# Patient Record
Sex: FEMALE | Race: WHITE | NOT HISPANIC OR LATINO | Employment: FULL TIME | URBAN - METROPOLITAN AREA
[De-identification: names, ages, dates, MRNs, and addresses within clinical notes are randomized per-mention and may not be internally consistent; named-entity substitution may affect disease eponyms.]

---

## 2017-01-04 ENCOUNTER — TELEPHONE (OUTPATIENT)
Dept: FAMILY MEDICINE | Facility: CLINIC | Age: 52
End: 2017-01-04

## 2017-01-04 DIAGNOSIS — D25.9 UTERINE LEIOMYOMA, UNSPECIFIED LOCATION: ICD-10-CM

## 2017-01-04 DIAGNOSIS — N83.201 CYST OF RIGHT OVARY: Primary | ICD-10-CM

## 2017-01-04 NOTE — TELEPHONE ENCOUNTER
[facility] called, Radiologist is working on the final results.   [facility] to call triage back.

## 2017-01-04 NOTE — TELEPHONE ENCOUNTER
Blake at [facility] was called, she will fax preliminary results and will check and call back re final results.   Fax received with preliminary results, will pend message for final results.

## 2017-01-04 NOTE — TELEPHONE ENCOUNTER
Reason for Call:  Request for results:    Name of test or procedure: Pelvic Ultrasound    Date of test of procedure: 12/30/16    Location of the test or procedure: [facility]     OK to leave the result message on voice mail or with a family member? YES    Phone number Patient can be reached at:  Cell number on file:    Telephone Information:   Mobile [phone]       Additional comments: Patient would like to know the results of the pelvic ultrasound that was done on 12/30/16    Call taken on 1/4/2017 at 1:40 PM by Finn Brooks

## 2017-01-05 NOTE — TELEPHONE ENCOUNTER
Patient calling to find out if the results of the U/S have been received yet.  She is anxious to hear the results.  Phone number:  [phone].  Dina Artis TC

## 2017-01-05 NOTE — TELEPHONE ENCOUNTER
Spoke with earle regarding her preliminary finding of rather large right ovarian cyst (8.2cm) along with several other smaller cysts noted on the right ovary and a small uterine fibroid, endometrial stripe 10mm. I recommend being seen by gyn for further evaluation and she agrees to that. Order/referral placed. Nicole Joy Siegler, PA-C

## 2017-02-11 ENCOUNTER — TRANSFERRED RECORDS (OUTPATIENT)
Dept: HEALTH INFORMATION MANAGEMENT | Facility: CLINIC | Age: 52
End: 2017-02-11

## 2017-02-27 DIAGNOSIS — Z30.41 ORAL CONTRACEPTIVE USE: Chronic | ICD-10-CM

## 2017-02-28 NOTE — TELEPHONE ENCOUNTER
Riaz       Last Written Prescription Date: 6/17/16  Last Fill Quantity: 84,  # refills: 2   Last Office Visit with [facility], [facility] or [facility] prescribing provider: 12/21/16

## 2017-03-01 NOTE — TELEPHONE ENCOUNTER
Left voice message asking pt call triage back.  Patient is due for physical.   She was referred to gynecology if having physical there she would need Rx refills from that office.

## 2017-12-16 DIAGNOSIS — N39.41 URGENCY INCONTINENCE: Chronic | ICD-10-CM

## 2017-12-19 RX ORDER — OXYBUTYNIN CHLORIDE 15 MG/1
TABLET, EXTENDED RELEASE ORAL
Qty: 30 TABLET | Refills: 2 | OUTPATIENT
Start: 2017-12-19

## 2017-12-19 NOTE — TELEPHONE ENCOUNTER
Requested Prescriptions   Pending Prescriptions Disp Refills     oxybutynin chloride 15 MG TB24 [Pharmacy Med Name: Oxybutynin Chloride ER Oral Tablet Extended Release 24 Hour 15 MG]  Last Written Prescription Date:  12/8/17  Last Fill Quantity: 30 tablet,  # refills: 0   Last Office Visit with G, P or [facility] prescribing provider:  12/21/16   Future Office Visit:    30 tablet 2     Sig: take 1 tablet by mouth daily    Muscarinic Antagonists (Urinary Incontinence Agents) Passed    12/16/2017 12:20 PM       Passed - Recent or future visit with authorizing provider's specialty    Patient had office visit in the last year or has a visit in the next 30 days with authorizing provider.  See chart review.          Passed - Medication is Oxybutynin and patient is 5 years of age or older       Passed - Patient does not have a diagnosis of glaucoma on the problem list    If glaucoma diagnosis is new, refer refill to physician.       Passed - Patient is 18 years of age or older

## 2017-12-28 ENCOUNTER — OFFICE VISIT (OUTPATIENT)
Dept: FAMILY MEDICINE | Facility: CLINIC | Age: 52
End: 2017-12-28
Payer: COMMERCIAL

## 2017-12-28 VITALS
OXYGEN SATURATION: 99 % | BODY MASS INDEX: 24.51 KG/M2 | RESPIRATION RATE: 16 BRPM | SYSTOLIC BLOOD PRESSURE: 120 MMHG | DIASTOLIC BLOOD PRESSURE: 78 MMHG | HEART RATE: 83 BPM | WEIGHT: 134 LBS | TEMPERATURE: 98.2 F

## 2017-12-28 DIAGNOSIS — J32.0 CHRONIC MAXILLARY SINUSITIS: Primary | ICD-10-CM

## 2017-12-28 PROCEDURE — 99213 OFFICE O/P EST LOW 20 MIN: CPT | Performed by: PHYSICIAN ASSISTANT

## 2017-12-28 NOTE — NURSING NOTE
"Chief Complaint   Patient presents with     URI       Initial /78  Pulse 83  Temp 98.2  F (36.8  C) (Tympanic)  Resp 16  Wt 134 lb (60.8 kg)  LMP 12/07/2016  SpO2 99%  BMI 24.51 kg/m2 Estimated body mass index is 24.51 kg/(m^2) as calculated from the following:    Height as of 12/21/16: 5' 2\" (1.575 m).    Weight as of this encounter: 134 lb (60.8 kg).  Medication Reconciliation: complete    "

## 2017-12-28 NOTE — Clinical Note
Please abstract the following data from this visit with this patient into the appropriate field in Epic:  Mammogram done on this date: 2/11/17 (approximately), by this group: [facility], results were normal, in care everywhere.

## 2017-12-28 NOTE — PROGRESS NOTES
SUBJECTIVE:   Margarita Quinn is a 52 year old female who presents to clinic today for the following health issues:    ENT Symptoms             Symptoms: cc Present Absent Comment   Fever/Chills   x    Fatigue  x     Muscle Aches  x     Eye Irritation   x    Sneezing   x    Nasal Larry/Drg  x     Sinus Pressure/Pain  x     Loss of smell   x    Dental pain   x    Sore Throat  x     Swollen Glands  x     Ear Pain/Fullness  x     Cough  x     Wheeze   x    Chest Pain   x    Shortness of breath   x    Rash   x    Other   x      Symptom duration:  3 weeks   Symptom severity:  moderate   Treatments tried:  mucinex and nyquil   Contacts:  works at [employer]     Reviewed and updated as needed this visit by clinical staff  Tobacco  Allergies  Meds  Problems  Med Hx  Surg Hx  Fam Hx  Soc Hx        Reviewed and updated as needed this visit by Provider  Tobacco  Allergies  Meds  Problems  Med Hx  Surg Hx  Fam Hx  Soc Hx        Additional complaints: None    HPI additional notes: Margarita presents today with   Chief Complaint   Patient presents with     URI        ROS:  C: Negative for fever, chills, recent change in weight  Skin: Negative for worrisome rashes or lesions  ENT/MOUTH:POSITIVE for congestion, ear pain, sore throat, post-nasal drainage and sinus pressure.  Negative for dental pain and vertigo.  Resp: POSITIVE for cough non-productive without SOB and wheezing  CV: Negative for chest pain or peripheral edema  GI: Negative for nausea, abdominal pain, heartburn, or change in bowel habits  MS: Negative for significant arthralgias or myalgias  NEURO: POSITIVE for headache, sinus  P: Negative for changes in mood or affect    Chart Review:  Today's PHQ-2 Score:    Today's PHQ-9 Score: No flowsheet data found.    History   Smoking Status     Former Smoker     Quit date: 10/11/1997   Smokeless Tobacco     Never Used       PFSH: History of sinus surgery last year, history of atopy       Patient Active Problem List    Diagnosis     Alopecia     Chronic maxillary sinusitis     Tobacco abuse: [age] y/o @2ppd=30 pk yr hx      Urgency incontinence     Dermatitis     Oral contraceptive use     Vaginitis and vulvovaginitis hx of recurrence      Past Surgical History:   Procedure Laterality Date     ABDOMEN SURGERY  2 Ovarian cysts removed and one tube and ovary removed    1994     CYSTOSCOPY, BIOPSY BLADDER, COMBINED  10/11/2012    Procedure: COMBINED CYSTOSCOPY, BIOPSY BLADDER;  CYSTOSCOPY, BLADDER BIOPSY, FULGURATION;  Surgeon: Herberth Scott MD;  Location: [facility] OR     FOOT SURGERY  surgery on toe     GYN SURGERY      ovarian cyst     HC TOOTH EXTRACTION W/FORCEP       Problem list, Medication list, Allergies, Medical/Social/Surg hx reviewed in [facility], updated as appropriate.   OBJECTIVE:                                                    /78  Pulse 83  Temp 98.2  F (36.8  C) (Tympanic)  Resp 16  Wt 134 lb (60.8 kg)  LMP 12/07/2016  SpO2 99%  BMI 24.51 kg/m2  Body mass index is 24.51 kg/(m^2).  GENERAL: healthy, alert, in no acute distress  EYES: Grossly normal to inspection, EOMI, PERRL  HENT: Ear canals normal bilaterally. TM mildly injected  bulging with serous effusion bilaterally.  Nasal mucosa mildly edematous with purulent rhinorrhea.  Mouth- mucous membranes moist, no lesions or ulcerations. Pharynx pink. and No tonsillary hypertrophy. Uvula midline, purulent post-nasal drainage.  Maxillary and frontal sinuses tender to palpation bilaterally with guarding  NECK:2+ posterior cervical LAD bilateral  RESP: lungs clear to auscultation - no rales, rhonchi or wheezes  CV: regular rate and rhythm, normal S1 S2.   SKIN: no suspicious lesions, no rashes  PSYCH: Alert and oriented times 3;  Able to articulate logical thoughts. Affect is normal.    Diagnostic test results: none      ASSESSMENT/PLAN:                                                          ICD-10-CM    1. Chronic maxillary sinusitis J32.0  amoxicillin-clavulanate (AUGMENTIN) 875-125 MG per tablet         Please see patient instructions for treatment details.    Follow up in 7-10 days if not improving as anticipated.      Karen Giron PA-C  [facility]

## 2017-12-28 NOTE — PATIENT INSTRUCTIONS
"1. Use saline nasal sprays or a neti-pot to wash out nasal passages and clear mucus from the airways.  2. Drink lots of fluids to keep the mucus thin.  OTC medications with active ingredient \"guaifenesin\" (mucinex) help to thin mucus.   3. Apply warm compresses to your sinuses to loosen congestion and promote drainage for 10-15 minutes at a time, 3 or more times a day.  Take hot showers and breath in the steam.  4. Use decongestants to relieve congestion and stop post-nasal drainage:    pseudoephedrine (Sudafed)- 60 mg every 4-6 hours. Avoid using before bedtime as it may keep you awake.  May cause an increase in blood pressure.    Afrin nasal spray- DO NOT use for longer than 3 days.  This will cause rebound congestion and worsening of symptoms.    Fluticasone (Flonase) nasal spray- OTC  medication that helps with chronic congestion.  Must be used daily and may take up to 2 weeks before improvement is noted.    Nasacort nasal spray-  OTC medication that helps with chronic congestion.  Must be used daily and may take up to 2 weeks before improvement is noted.  5. Avoid tobacco smoke.  Avoid any allergy triggers.  OTC Allergy medications (non-drowsy):     Loratadine (Claritin)10mg daily     Fexofenadine (Allegra) 180mg daily                                                                 Cetirizine (Zyrtec)10mg daily  6. Take ibuprofen (600mg every 6 hours with food or water or aleve 440 mg every 12 hours) and tylenol (500mg every 6 hours) for pain.     Sinusitis  Sinusitis is swollen, infected linings of the sinuses. The sinuses are hollow spaces in the bones of your face and skull that with the nose through small openings. Like the nose, their linings make mucus.   How does it occur?     Sinusitis occurs when the sinus linings become infected. The passageways from the sinuses to the nose are very narrow. Swelling and mucus may block the passageways. This leads to pressure changes in the sinuses that can be painful. "     A number of things can cause swelling and sinusitis. Most often it's allergens (things that cause allergies, like pollen and mold) and viruses, such as viruses that cause the common cold. Whether the cause is allergies or a virus, the sinus linings can swell. When swelling causes the sinus passageway to swell shut, bacteria, viruses, and even fungus can be trapped in the sinuses and cause a sinus infection.  This usually does not occur until at least 10-14 days of symptoms.    If your nasal bones have been injured or are deformed, causing partial blockage of the sinus openings, you are more likely to get sinusitis.   How long will the effects last?   Symptoms may get better gradually over 3 to 10 days but may last for days or weeks.   How can I help prevent sinusitis?   Treat your colds/allergies promptly. Use decongestants as soon as you start having symptoms.   Do not smoke and stay away from secondhand smoke.   Drink lots of fluids to keep the mucus thin.    If sinusitis continues to be a problem despite treatment, you might need an exam by an ear, nose, and throat doctor (called an ENT or otolaryngologist). The specialist will check for polyps or a deformed bone that may be blocking your sinuses.

## 2017-12-28 NOTE — MR AVS SNAPSHOT
"              After Visit Summary   12/28/2017    Margarita Quinn    MRN: [id number]           Patient Information     Date Of Birth          1965        Visit Information        Provider Department      12/28/2017 12:50 PM Karen Giron PA-C [facility]        Today's Diagnoses     Chronic maxillary sinusitis    -  1      Care Instructions    1. Use saline nasal sprays or a neti-pot to wash out nasal passages and clear mucus from the airways.  2. Drink lots of fluids to keep the mucus thin.  OTC medications with active ingredient \"guaifenesin\" (mucinex) help to thin mucus.   3. Apply warm compresses to your sinuses to loosen congestion and promote drainage for 10-15 minutes at a time, 3 or more times a day.  Take hot showers and breath in the steam.  4. Use decongestants to relieve congestion and stop post-nasal drainage:    pseudoephedrine (Sudafed)- 60 mg every 4-6 hours. Avoid using before bedtime as it may keep you awake.  May cause an increase in blood pressure.    Afrin nasal spray- DO NOT use for longer than 3 days.  This will cause rebound congestion and worsening of symptoms.    Fluticasone (Flonase) nasal spray- OTC  medication that helps with chronic congestion.  Must be used daily and may take up to 2 weeks before improvement is noted.    Nasacort nasal spray-  OTC medication that helps with chronic congestion.  Must be used daily and may take up to 2 weeks before improvement is noted.  5. Avoid tobacco smoke.  Avoid any allergy triggers.  OTC Allergy medications (non-drowsy):     Loratadine (Claritin)10mg daily     Fexofenadine (Allegra) 180mg daily                                                                 Cetirizine (Zyrtec)10mg daily  6. Take ibuprofen (600mg every 6 hours with food or water or aleve 440 mg every 12 hours) and tylenol (500mg every 6 hours) for pain.     Sinusitis  Sinusitis is swollen, infected linings of the sinuses. The sinuses " are hollow spaces in the bones of your face and skull that with the nose through small openings. Like the nose, their linings make mucus.   How does it occur?     Sinusitis occurs when the sinus linings become infected. The passageways from the sinuses to the nose are very narrow. Swelling and mucus may block the passageways. This leads to pressure changes in the sinuses that can be painful.     A number of things can cause swelling and sinusitis. Most often it's allergens (things that cause allergies, like pollen and mold) and viruses, such as viruses that cause the common cold. Whether the cause is allergies or a virus, the sinus linings can swell. When swelling causes the sinus passageway to swell shut, bacteria, viruses, and even fungus can be trapped in the sinuses and cause a sinus infection.  This usually does not occur until at least 10-14 days of symptoms.    If your nasal bones have been injured or are deformed, causing partial blockage of the sinus openings, you are more likely to get sinusitis.   How long will the effects last?   Symptoms may get better gradually over 3 to 10 days but may last for days or weeks.   How can I help prevent sinusitis?   Treat your colds/allergies promptly. Use decongestants as soon as you start having symptoms.   Do not smoke and stay away from secondhand smoke.   Drink lots of fluids to keep the mucus thin.    If sinusitis continues to be a problem despite treatment, you might need an exam by an ear, nose, and throat doctor (called an ENT or otolaryngologist). The specialist will check for polyps or a deformed bone that may be blocking your sinuses.             Follow-ups after your visit        Who to contact     If you have questions or need follow up information about today's clinic visit or your schedule please contact [facility] directly at [phone].  Normal or non-critical lab and imaging results will be communicated to you by  "MyChart, letter or phone within 4 business days after the clinic has received the results. If you do not hear from us within 7 days, please contact the clinic through [employer] or phone. If you have a critical or abnormal lab result, we will notify you by phone as soon as possible.  Submit refill requests through [employer] or call your pharmacy and they will forward the refill request to us. Please allow 3 business days for your refill to be completed.          Additional Information About Your Visit        [employer] Information     [employer] lets you send messages to your doctor, view your test results, renew your prescriptions, schedule appointments and more. To sign up, go to [email or web address] . Click on \"Log in\" on the left side of the screen, which will take you to the Welcome page. Then click on \"Sign up Now\" on the right side of the page.     You will be asked to enter the access code listed below, as well as some personal information. Please follow the directions to create your username and password.     Your access code is: [email or web address]  Expires: 3/28/2018 12:59 PM     Your access code will  in 90 days. If you need help or a new code, please call your [place] clinic or [phone].        Care EveryWhere ID     This is your Care EveryWhere ID. This could be used by other organizations to access your [place] medical records  [id number]        Your Vitals Were     Pulse Temperature Respirations Last Period Pulse Oximetry BMI (Body Mass Index)    83 98.2  F (36.8  C) (Tympanic) 16 2016 99% 24.51 kg/m2       Blood Pressure from Last 3 Encounters:   17 120/78   16 102/78   16 110/60    Weight from Last 3 Encounters:   17 134 lb (60.8 kg)   16 129 lb 8 oz (58.7 kg)   16 132 lb (59.9 kg)              Today, you had the following     No orders found for display         Today's Medication Changes          These changes are accurate as of: 17 12:59 PM.  If you " have any questions, ask your nurse or doctor.               Start taking these medicines.        Dose/Directions    amoxicillin-clavulanate 875-125 MG per tablet   Commonly known as:  AUGMENTIN   Used for:  Chronic maxillary sinusitis   Started by:  Karen Giron PA-C        Dose:  1 tablet   Take 1 tablet by mouth 2 times daily   Quantity:  20 tablet   Refills:  0            Where to get your medicines      These medications were sent to [id number] - [place], [place] - [address]  [address] [address]     Phone:  [phone]     amoxicillin-clavulanate 875-125 MG per tablet                Primary Care Provider Office Phone # Fax #    Estefany Haney [phone] [address] [facility] [address]        Equal Access to Services     [facility]: Hadii johnathon ku hadasho Soomaali, waaxda luqadaha, qaybta kaalmada adeegyada, justus serrano haycarter teague . So [facility] [phone].    ATENCIÓN: Si habla español, tiene a hollins disposición servicios gratuitos de asistencia lingüística. Llame al [phone].    We comply with applicable federal civil rights laws and [place] laws. We do not discriminate on the basis of race, color, national origin, age, disability, sex, sexual orientation, or gender identity.            Thank you!     Thank you for choosing [facility]  for your care. Our goal is always to provide you with excellent care. Hearing back from our patients is one way we can continue to improve our services. Please take a few minutes to complete the written survey that you may receive in the mail after your visit with us. Thank you!             Your Updated Medication List - Protect others around you: Learn how to safely use, store and throw away your medicines at www.disposemymeds.org.          This list is accurate as of: 12/28/17 12:59 PM.  Always use your most recent med  list.                   Brand Name Dispense Instructions for use Diagnosis    ACIDOPHILUS PROBIOTIC BLEND Caps     1 capsule    Take 1 tablet by mouth daily    Fatigue, Flank pain       amoxicillin-clavulanate 875-125 MG per tablet    AUGMENTIN    20 tablet    Take 1 tablet by mouth 2 times daily    Chronic maxillary sinusitis       CALCIUM CITRATE + D3 250-200 MG-UNIT Tabs   Generic drug:  Calcium Citrate-Vitamin D      Take 1 tablet by mouth daily        cholecalciferol 400 UNIT Tabs tablet    vitamin D3     Take 400 Units by mouth daily        fluticasone 50 MCG/ACT spray    FLONASE    16 g    INHALE 2 SPRAYS IN EACH NOSTRIL EVERY DAY    Unspecified sinusitis (chronic)       loratadine-pseudoePHEDrine  MG per 24 hr tablet    CLARITIN-D 24-hour    30 tablet    Take 1 tablet by mouth daily.    Maxillary sinusitis, Upper respiratory infection, viral, Rhinitis       MYZILRA per tablet   Generic drug:  levonorgestrel-ethinyl estradiol     30 tablet    TAKE 1 TABLET BY MOUTH DAILY    Oral contraceptive use       oxybutynin chloride 15 MG Tb24     30 tablet    take 1 tablet by mouth daily    Urgency incontinence

## 2018-01-19 DIAGNOSIS — N39.41 URGENCY INCONTINENCE: Chronic | ICD-10-CM

## 2018-01-19 NOTE — TELEPHONE ENCOUNTER
Requested Prescriptions   Pending Prescriptions Disp Refills     oxybutynin chloride 15 MG TB24  Last Written Prescription Date:  12/18/17  Last Fill Quantity: 30,  # refills: 0   Last Office Visit  12/28/2017        with  [facility], P or [facility] prescribing provider:     Future Office Visit:        30 tablet 0     Sig: Take 1 tablet (15 mg) by mouth daily    There is no refill protocol information for this order

## 2018-01-22 RX ORDER — OXYBUTYNIN CHLORIDE 15 MG/1
1 TABLET, EXTENDED RELEASE ORAL DAILY
Qty: 90 TABLET | Refills: 2 | Status: SHIPPED | OUTPATIENT
Start: 2018-01-22 | End: 2018-10-26

## 2018-01-22 NOTE — TELEPHONE ENCOUNTER
Prescription approved per [facility] Refill Protocol for 12 months of refills since last appointment, which was 12/28/17

## 2018-01-26 ENCOUNTER — OFFICE VISIT (OUTPATIENT)
Dept: FAMILY MEDICINE | Facility: CLINIC | Age: 53
End: 2018-01-26
Payer: COMMERCIAL

## 2018-01-26 VITALS
BODY MASS INDEX: 24.6 KG/M2 | DIASTOLIC BLOOD PRESSURE: 66 MMHG | TEMPERATURE: 97.4 F | SYSTOLIC BLOOD PRESSURE: 100 MMHG | RESPIRATION RATE: 14 BRPM | OXYGEN SATURATION: 97 % | HEART RATE: 73 BPM | WEIGHT: 134.5 LBS

## 2018-01-26 DIAGNOSIS — J01.01 ACUTE RECURRENT MAXILLARY SINUSITIS: Primary | ICD-10-CM

## 2018-01-26 PROCEDURE — 99214 OFFICE O/P EST MOD 30 MIN: CPT | Performed by: FAMILY MEDICINE

## 2018-01-26 RX ORDER — DOXYCYCLINE 100 MG/1
100 CAPSULE ORAL 2 TIMES DAILY
Qty: 14 CAPSULE | Refills: 0 | Status: SHIPPED | OUTPATIENT
Start: 2018-01-26 | End: 2018-02-02

## 2018-01-26 NOTE — MR AVS SNAPSHOT
After Visit Summary   1/26/2018    Margarita Quinn    MRN: [id number]           Patient Information     Date Of Birth          1965        Visit Information        Provider Department      1/26/2018 3:30 PM Ana Eason DO [facility]        Today's Diagnoses     Acute recurrent maxillary sinusitis    -  1      Care Instructions      Sinusitis (Antibiotic Treatment)    The sinuses are air-filled spaces within the bones of the face. They connect to the inside of the nose. Sinusitis is an inflammation of the tissue lining the sinus cavity. Sinus inflammation can occur during a cold. It can also be due to allergies to pollens and other particles in the air. Sinusitis can cause symptoms of sinus congestion and fullness. A sinus infection causes fever, headache and facial pain. There is often green or yellow drainage from the nose or into the back of the throat (post-nasal drip). You have been given antibiotics to treat this condition.  Home care:    Take the full course of antibiotics as instructed. Do not stop taking them, even if you feel better.    Drink plenty of water, hot tea, and other liquids. This may help thin mucus. It also may promote sinus drainage.    Heat may help soothe painful areas of the face. Use a towel soaked in hot water. Or,  the shower and direct the hot spray onto your face. Using a vaporizer along with a menthol rub at night may also help.     An expectorant containing guaifenesin may help thin the mucus and promote drainage from the sinuses.    Over-the-counter decongestants may be used unless a similar medicine was prescribed. Nasal sprays work the fastest. Use one that contains phenylephrine or oxymetazoline. First blow the nose gently. Then use the spray. Do not use these medicines more often than directed on the label or symptoms may get worse. You may also use tablets containing pseudoephedrine. Avoid products that combine  ingredients, because side effects may be increased. Read labels. You can also ask the pharmacist for help. (NOTE: Persons with high blood pressure should not use decongestants. They can raise blood pressure.)    Over-the-counter antihistamines may help if allergies contributed to your sinusitis.      Do not use nasal rinses or irrigation during an acute sinus infection, unless told to by your health care provider. Rinsing may spread the infection to other sinuses.    Use acetaminophen or ibuprofen to control pain, unless another pain medicine was prescribed. (If you have chronic liver or kidney disease or ever had a stomach ulcer, talk with your doctor before using these medicines. Aspirin should never be used in anyone under 18 years of age who is ill with a fever. It may cause severe liver damage.)    Don't smoke. This can worsen symptoms.  Follow-up care  Follow up with your healthcare provider or our staff if you are not improving within the next week.  When to seek medical advice  Call your healthcare provider if any of these occur:    Facial pain or headache becoming more severe    Stiff neck    Unusual drowsiness or confusion    Swelling of the forehead or eyelids    Vision problems, including blurred or double vision    Fever of 100.4 F (38 C) or higher, or as directed by your healthcare provider    Seizure    Breathing problems    Symptoms not resolving within 10 days  Date Last Reviewed: 4/13/2015 2000-2017 [employer]. [address] [place] [address], [place] [id number]. All rights reserved. This information is not intended as a substitute for professional medical care. Always follow your healthcare professional's instructions.                Follow-ups after your visit        Follow-up notes from your care team     Return if symptoms worsen or fail to improve.      Who to contact     If you have questions or need follow up information about today's clinic visit or your schedule please contact  [facility] directly at [phone].  Normal or non-critical lab and imaging results will be communicated to you by MyChart, letter or phone within 4 business days after the clinic has received the results. If you do not hear from us within 7 days, please contact the clinic through MyChart or phone. If you have a critical or abnormal lab result, we will notify you by phone as soon as possible.  Submit refill requests through [employer] or call your pharmacy and they will forward the refill request to us. Please allow 3 business days for your refill to be completed.          Additional Information About Your Visit        [employer] Information     [employer] lets you send messages to your doctor, view your test results, renew your prescriptions, schedule appointments and more. To sign up, go to [email or web address] . Click on \"Log in\" on the left side of the screen, which will take you to the Welcome page. Then click on \"Sign up Now\" on the right side of the page.     You will be asked to enter the access code listed below, as well as some personal information. Please follow the directions to create your username and password.     Your access code is: [email or web address]  Expires: 3/28/2018 12:59 PM     Your access code will  in 90 days. If you need help or a new code, please call your [facility] clinic or [phone].        Care EveryWhere ID     This is your Care EveryWhere ID. This could be used by other organizations to access your [facility] medical records  [id number]        Your Vitals Were     Pulse Temperature Respirations Last Period Pulse Oximetry BMI (Body Mass Index)    73 97.4  F (36.3  C) (Tympanic) 14 2016 97% 24.6 kg/m2       Blood Pressure from Last 3 Encounters:   18 100/66   17 120/78   16 102/78    Weight from Last 3 Encounters:   18 134 lb 8 oz (61 kg)   17 134 lb (60.8 kg)   16 129 lb 8 oz (58.7 kg)              Today, you had the " following     No orders found for display         Today's Medication Changes          These changes are accurate as of 1/26/18  3:48 PM.  If you have any questions, ask your nurse or doctor.               Start taking these medicines.        Dose/Directions    doxycycline monohydrate 100 MG capsule   Used for:  Acute recurrent maxillary sinusitis   Started by:  Ana Eason DO        Dose:  100 mg   Take 1 capsule (100 mg) by mouth 2 times daily for 7 days   Quantity:  14 capsule   Refills:  0            Where to get your medicines      These medications were sent to [id number] - [place], [place] - [address]  [address] [address]     Phone:  [phone]     doxycycline monohydrate 100 MG capsule                Primary Care Provider Office Phone # Fax #    Karen Giron PA-C [phone] [address] [facility] [address]  [facility] [address]        Equal Access to Services     SEEMA GRIFFITH AH: Hadii aad ku hadasho Soomaali, waaxda luqadaha, qaybta kaalmada adeegyada, waxay idiin hayaan nelida kharaadrian teague . So [facility] [phone].    ATENCIÓN: Si tracyla español, tiene a hollins disposición servicios gratuitos de asistencia lingüística. Llame al [phone].    We comply with applicable federal civil rights laws and [place] laws. We do not discriminate on the basis of race, color, national origin, age, disability, sex, sexual orientation, or gender identity.            Thank you!     Thank you for choosing [facility]  for your care. Our goal is always to provide you with excellent care. Hearing back from our patients is one way we can continue to improve our services. Please take a few minutes to complete the written survey that you may receive in the mail after your visit with us. Thank you!             Your Updated Medication List - Protect others around you: Learn how to safely use, store and throw away your  medicines at www.disposemymeds.org.          This list is accurate as of 1/26/18  3:48 PM.  Always use your most recent med list.                   Brand Name Dispense Instructions for use Diagnosis    ACIDOPHILUS PROBIOTIC BLEND Caps     1 capsule    Take 1 tablet by mouth daily    Fatigue, Flank pain       CALCIUM CITRATE + D3 250-200 MG-UNIT Tabs   Generic drug:  Calcium Citrate-Vitamin D      Take 1 tablet by mouth daily        cholecalciferol 400 UNIT Tabs tablet    vitamin D3     Take 400 Units by mouth daily        doxycycline monohydrate 100 MG capsule     14 capsule    Take 1 capsule (100 mg) by mouth 2 times daily for 7 days    Acute recurrent maxillary sinusitis       fluticasone 50 MCG/ACT spray    FLONASE    16 g    INHALE 2 SPRAYS IN EACH NOSTRIL EVERY DAY    Unspecified sinusitis (chronic)       loratadine-pseudoePHEDrine  MG per 24 hr tablet    CLARITIN-D 24-hour    30 tablet    Take 1 tablet by mouth daily.    Maxillary sinusitis, Upper respiratory infection, viral, Rhinitis       MYZILRA per tablet   Generic drug:  levonorgestrel-ethinyl estradiol     30 tablet    TAKE 1 TABLET BY MOUTH DAILY    Oral contraceptive use       oxybutynin chloride 15 MG Tb24     90 tablet    Take 1 tablet (15 mg) by mouth daily    Urgency incontinence

## 2018-01-26 NOTE — PATIENT INSTRUCTIONS
Sinusitis (Antibiotic Treatment)    The sinuses are air-filled spaces within the bones of the face. They connect to the inside of the nose. Sinusitis is an inflammation of the tissue lining the sinus cavity. Sinus inflammation can occur during a cold. It can also be due to allergies to pollens and other particles in the air. Sinusitis can cause symptoms of sinus congestion and fullness. A sinus infection causes fever, headache and facial pain. There is often green or yellow drainage from the nose or into the back of the throat (post-nasal drip). You have been given antibiotics to treat this condition.  Home care:    Take the full course of antibiotics as instructed. Do not stop taking them, even if you feel better.    Drink plenty of water, hot tea, and other liquids. This may help thin mucus. It also may promote sinus drainage.    Heat may help soothe painful areas of the face. Use a towel soaked in hot water. Or,  the shower and direct the hot spray onto your face. Using a vaporizer along with a menthol rub at night may also help.     An expectorant containing guaifenesin may help thin the mucus and promote drainage from the sinuses.    Over-the-counter decongestants may be used unless a similar medicine was prescribed. Nasal sprays work the fastest. Use one that contains phenylephrine or oxymetazoline. First blow the nose gently. Then use the spray. Do not use these medicines more often than directed on the label or symptoms may get worse. You may also use tablets containing pseudoephedrine. Avoid products that combine ingredients, because side effects may be increased. Read labels. You can also ask the pharmacist for help. (NOTE: Persons with high blood pressure should not use decongestants. They can raise blood pressure.)    Over-the-counter antihistamines may help if allergies contributed to your sinusitis.      Do not use nasal rinses or irrigation during an acute sinus infection, unless told to by  your health care provider. Rinsing may spread the infection to other sinuses.    Use acetaminophen or ibuprofen to control pain, unless another pain medicine was prescribed. (If you have chronic liver or kidney disease or ever had a stomach ulcer, talk with your doctor before using these medicines. Aspirin should never be used in anyone under 18 years of age who is ill with a fever. It may cause severe liver damage.)    Don't smoke. This can worsen symptoms.  Follow-up care  Follow up with your healthcare provider or our staff if you are not improving within the next week.  When to seek medical advice  Call your healthcare provider if any of these occur:    Facial pain or headache becoming more severe    Stiff neck    Unusual drowsiness or confusion    Swelling of the forehead or eyelids    Vision problems, including blurred or double vision    Fever of 100.4 F (38 C) or higher, or as directed by your healthcare provider    Seizure    Breathing problems    Symptoms not resolving within 10 days  Date Last Reviewed: 4/13/2015 2000-2017 [employer]. [address] [place] [address], [place] [id number]. All rights reserved. This information is not intended as a substitute for professional medical care. Always follow your healthcare professional's instructions.

## 2018-01-26 NOTE — NURSING NOTE
"Chief Complaint   Patient presents with     URI       Initial /66  Pulse 73  Temp 97.4  F (36.3  C) (Tympanic)  Resp 14  Wt 134 lb 8 oz (61 kg)  LMP 12/07/2016  SpO2 97%  BMI 24.6 kg/m2 Estimated body mass index is 24.6 kg/(m^2) as calculated from the following:    Height as of 12/21/16: 5' 2\" (1.575 m).    Weight as of this encounter: 134 lb 8 oz (61 kg).  Medication Reconciliation: unable or not appropriate to perform   Fatou Campos MA student     "

## 2018-01-26 NOTE — PROGRESS NOTES
"  SUBJECTIVE:   Margarita Quinn is a 52 year old female who presents to clinic today for the following health issues:    RESPIRATORY SYMPTOMS      Duration: approx. 3 weeks     Description  sore throat, facial pain/pressure, ear pain right, headache and fatigue/malaise    Severity: moderate    Accompanying signs and symptoms: None    History (predisposing factors):  strep exposure    Precipitating or alleviating factors: None    Therapies tried and outcome:  oral decongestant guaifenesin helps symptoms for short period.     Hx sinus surgery 1 year ago Dec 2016 (ENT \"Dr. Rudolph\" at [email or web address]) which helped.  Used to get 3-4 sinus infections per year.    Problem list and histories reviewed & adjusted, as indicated.  Additional history: as documented    Labs reviewed in EPIC    Reviewed and updated as needed this visit by clinical staff  Tobacco  Allergies  Meds  Problems  Med Hx  Surg Hx  Fam Hx  Soc Hx        Reviewed and updated as needed this visit by Provider  Allergies  Meds  Problems         ROS:  C: Negative for fever, chills, recent change in weight  Skin: Negative for worrisome rashes or lesions  ENT/MOUTH:POSITIVE for congestion, ear pain, sore throat, post-nasal drainage and sinus pressure.    Resp: POSITIVE for cough non-productive without SOB or wheezing  CV: Negative for chest pain or peripheral edema  GI: Negative for nausea, abdominal pain, heartburn, or change in bowel habits  MS: Negative for significant arthralgias or myalgias  NEURO: POSITIVE for headache  P: Negative for changes in mood or affect    OBJECTIVE:     /66  Pulse 73  Temp 97.4  F (36.3  C) (Tympanic)  Resp 14  Wt 134 lb 8 oz (61 kg)  LMP 12/07/2016  SpO2 97%  BMI 24.6 kg/m2  Body mass index is 24.6 kg/(m^2).   GENERAL: alert and no distress  EYES: Eyes grossly normal to inspection, PERRL and conjunctivae and sclerae normal  HENT: ear canals and TM's normal, mouth without ulcers or lesions.  +b/l boggy " turbinates, no active nasal drainage.  NECK: no adenopathy, no asymmetry, masses, or scars and thyroid normal to palpation  RESP: lungs clear to auscultation - no rales, rhonchi or wheezes  CV: regular rate and rhythm, normal S1 S2, no S3 or S4, no murmur, click or rub, no peripheral edema and peripheral pulses strong  SKIN: no suspicious lesions or rashes      Diagnostic Test Results:  none     ASSESSMENT/PLAN:     Problem List Items Addressed This Visit     None      Visit Diagnoses     Acute recurrent maxillary sinusitis    -  Primary    Relevant Medications    doxycycline monohydrate 100 MG capsule         --Failed 1st line treatment with Augmentin 3 weeks ago.  Will try Rx Doxycycline  Encouraged pt to f/u with ENT provider at [facility], especially if her sinus infection does not improve.    --push fluids, rest, and symptomatic treatment as needed:  Mucinex 600 mg 2 tabs bid  Increase humidity to 30-40% in bedroom at night - vaporizer  Saline nasal spray as needed  Benadryl 25mg 1/2 - 1 hour before bed time  Maintain 8 hr minimum of sleep at night  Robitussin for cough  --Will return to clinic as needed.  See Patient Instructions for details and follow-up instructions      Ana Eason, DO  [facility]

## 2018-04-12 ENCOUNTER — TELEPHONE (OUTPATIENT)
Dept: FAMILY MEDICINE | Facility: CLINIC | Age: 53
End: 2018-04-12

## 2018-04-12 ENCOUNTER — OFFICE VISIT (OUTPATIENT)
Dept: FAMILY MEDICINE | Facility: CLINIC | Age: 53
End: 2018-04-12
Payer: COMMERCIAL

## 2018-04-12 VITALS
SYSTOLIC BLOOD PRESSURE: 128 MMHG | DIASTOLIC BLOOD PRESSURE: 80 MMHG | TEMPERATURE: 99 F | RESPIRATION RATE: 16 BRPM | HEIGHT: 62 IN | WEIGHT: 133 LBS | HEART RATE: 85 BPM | OXYGEN SATURATION: 97 % | BODY MASS INDEX: 24.48 KG/M2

## 2018-04-12 DIAGNOSIS — R30.0 DYSURIA: ICD-10-CM

## 2018-04-12 DIAGNOSIS — N95.2 VAGINAL ATROPHY: Primary | ICD-10-CM

## 2018-04-12 LAB
ALBUMIN UR-MCNC: NEGATIVE MG/DL
APPEARANCE UR: CLEAR
BILIRUB UR QL STRIP: NEGATIVE
COLOR UR AUTO: YELLOW
GLUCOSE UR STRIP-MCNC: NEGATIVE MG/DL
HGB UR QL STRIP: NEGATIVE
KETONES UR STRIP-MCNC: NEGATIVE MG/DL
LEUKOCYTE ESTERASE UR QL STRIP: NEGATIVE
NITRATE UR QL: NEGATIVE
PH UR STRIP: 5 PH (ref 5–7)
SOURCE: NORMAL
SP GR UR STRIP: >1.03 (ref 1–1.03)
UROBILINOGEN UR STRIP-ACNC: 0.2 EU/DL (ref 0.2–1)

## 2018-04-12 PROCEDURE — 87210 SMEAR WET MOUNT SALINE/INK: CPT | Performed by: FAMILY MEDICINE

## 2018-04-12 PROCEDURE — 99213 OFFICE O/P EST LOW 20 MIN: CPT | Performed by: FAMILY MEDICINE

## 2018-04-12 PROCEDURE — 81003 URINALYSIS AUTO W/O SCOPE: CPT | Performed by: FAMILY MEDICINE

## 2018-04-12 RX ORDER — METHYLPREDNISOLONE 4 MG
TABLET, DOSE PACK ORAL
COMMUNITY
Start: 2018-04-09 | End: 2018-11-02

## 2018-04-12 NOTE — NURSING NOTE
"Chief Complaint   Patient presents with     UTI     Lower back pain     Vaginal Problem     Vaginal itching, painful intercourse.       Initial /80 (BP Location: Left arm, Patient Position: Sitting, Cuff Size: Adult Regular)  Pulse 85  Temp 99  F (37.2  C) (Tympanic)  Resp 16  Ht 5' 2\" (1.575 m)  Wt 133 lb (60.3 kg)  LMP 12/07/2016  SpO2 97%  Breastfeeding? No  BMI 24.33 kg/m2 Estimated body mass index is 24.33 kg/(m^2) as calculated from the following:    Height as of this encounter: 5' 2\" (1.575 m).    Weight as of this encounter: 133 lb (60.3 kg).  Medication Reconciliation: complete     Tammy Mortensen LPN  "

## 2018-04-12 NOTE — MR AVS SNAPSHOT
After Visit Summary   4/12/2018    Margarita Quinn    MRN: [id number]           Patient Information     Date Of Birth          1965        Visit Information        Provider Department      4/12/2018 3:30 PM Ana Eason DO [facility]        Today's Diagnoses     Dysuria    -  1    Screen for colon cancer        Vaginal atrophy          Care Instructions      Anatomy of the Female Urinary Tract  Your urinary tract helps get rid of urine (your body s liquid waste). The kidneys collect chemicals and water your body doesn t need. This is turned into urine. Urine travels out of the kidneys through the ureters to the bladder. The bladder holds urine until you re ready to release it. The urethra carries urine from the bladder out of the body. The main sphincter muscle circles the mid-urethra.      Front view of female urinary tract.     Date Last Reviewed: 1/1/2017 2000-2017 [employer]. [address] [place] [address]. All rights reserved. This information is not intended as a substitute for professional medical care. Always follow your healthcare professional's instructions.        Atrophic Vaginitis    Atrophic vaginitis means the walls of your vagina have become thin. This happens when your body makes too little of the hormone estrogen. Menopause or surgical removal of the ovaries are the most common causes for a drop in estrogen. Breastfeeding can also cause the hormone level to drop.  Symptoms of atrophic vaginitis include:    Dryness, soreness, burning, or itching in the vagina    Vaginal discharge  Sex can be uncomfortable, even painful. After sex, you may have bleeding from your vagina. You may also have burning or pain when you urinate.  Home care  Your healthcare provider may recommend 1 or more of these as treatment:    Vaginal creams, lotions, and lubricants. These products help relieve vaginal dryness. They don t need a  prescription. They can be found in the personal care section of most pharmacies. Creams and lotions are used daily to help keep the vagina moist. Lubricants help reduce dryness and pain during sex. Choose water-based lubricants. Don t use petroleum jelly, mineral oil, or other oils. These increase the chance of infection.     Hormone therapy (HT). HT increases the amount of estrogen in your body. This can help manage or relieve symptoms. HT can be given in pill form. It may be given as a lotion, cream, ring put into the vagina, or a patch on the skin. The risks and benefits of HT vary for each woman. For instance, your risk may be higher if you have had breast cancer. Discuss this treatment with your healthcare provider. Not every woman can use HT.  You don t need to give up (abstain from) sex. In fact, regular sex can help keep vaginal tissues healthy. Take steps to make sex more comfortable by using water-based lubricants.  Preventing infections  Atrophic vaginitis makes an infection of the vagina or the urinary tract more likely. To help reduce your risk:    Keep your genitals clean. When you bathe, wash the outside of your vagina with mild soap and water. Clean gently between the folds of your vagina.    Wipe from front to back after a bowel movement.    Don t douche unless your healthcare provider tells you to.    Avoid scented toilet paper, scented vaginal sprays, and scented tampons.    Avoid wearing clothes that are tight in the crotch. These include pantyhose, jeans, and leggings. Wear cotton underwear. Change it every day.  Follow-up care  Follow up with your healthcare provider, or as advised.  When to seek medical advice  Call your health care provider right away if any of these occur:    Fever of 100.4 F (38 C) or higher, or as directed by your healthcare provider    Symptoms don t go away or get worse even with treatment    Vaginal area swells or becomes painful    Vaginal area bleeds, but not because  of your period    Bad-smelling discharge from the vagina    Pain or burning when you urinate or you have trouble passing urine    Open sores develop around vagina   Date Last Reviewed: 12/1/2016 2000-2017 [employer]. [address] [place] [address], [place] [address]. All rights reserved. This information is not intended as a substitute for professional medical care. Always follow your healthcare professional's instructions.        Preventing Vaginitis     Use mild, unscented soap when you bathe or shower to avoid irritating your vagina.    Vaginitis is irritation or infection of the vagina or vulva (the outside opening of the vagina). Vaginitis can be caused by bacteria, viruses, parasites, or yeast. Chemicals (such as in perfumes or soaps or in spermicides) can sometimes be a cause. Vaginitis can be caused by hormone changes in pregnancy or with menopause. You can help prevent vaginitis. Follow the tips below. And see your healthcare provider if you have any symptoms.  Hygiene    Avoid chemicals. Do not use vaginal sprays. Do not use scented toilet paper or tampons that are scented. Sprays and scents have chemicals that can irritate your vagina.    Do not douche unless you are told to by your healthcare provider. Douching is rarely needed. And it upsets the normal balance in the vagina.    Wash yourself well. Wash the outer vaginal area (vulva) every day with mild, unscented soap. Keep it as dry as possible.    Wipe correctly. Make sure to wipe from front to back after a bowel movement. This helps keep from spreading bacteria from your anus to your vagina.    Change your tampon often. During your period, make sure to change your tampon as often as directed on the package. This allows the normal flow of vaginal discharge and blood.  Lifestyle    Limit your number of sexual partners. The more partners you have, the greater your risk of infection. Using condoms helps reduce your risk.    Get enough sleep.  Sleep helps keep your body s immune system healthy. This helps you fight infection.    Lose weight, if needed. Excess weight can reduce air circulation around your vagina. This can increase your risk of infection.    Exercise regularly. Regular activity helps keep your body healthy.    Take antibiotics only as directed. Antibiotics can change the normal chemical balance in the vagina.    Clothing    Don t sit in wet clothes. Yeast thrives when it s warm and damp.    Don t wear tight pants. And don t wear tights, leggings, or hose without a cotton crotch. These types of clothing trap warmth and moisture.    Wear cotton underwear. Cotton lets air circulate around the vagina.  Symptoms of vaginitis    Irritation, swelling, or itching of the genital area    Vaginal discharge    Bad vaginal odor    Pain or burning during urination   Date Last Reviewed: 12/1/2016 2000-2017 [employer]. [address] [place] [address]. All rights reserved. This information is not intended as a substitute for professional medical care. Always follow your healthcare professional's instructions.                Follow-ups after your visit        Follow-up notes from your care team     Return if symptoms worsen or fail to improve.      Who to contact     If you have questions or need follow up information about today's clinic visit or your schedule please contact [facility] directly at [phone].  Normal or non-critical lab and imaging results will be communicated to you by MyChart, letter or phone within 4 business days after the clinic has received the results. If you do not hear from us within 7 days, please contact the clinic through MyChart or phone. If you have a critical or abnormal lab result, we will notify you by phone as soon as possible.  Submit refill requests through [employer] or call your pharmacy and they will forward the refill request to us. Please allow 3 business days  "for your refill to be completed.          Additional Information About Your Visit        "[employer]hart Information     [employer] lets you send messages to your doctor, view your test results, renew your prescriptions, schedule appointments and more. To sign up, go to [email or web address] . Click on \"Log in\" on the left side of the screen, which will take you to the Welcome page. Then click on \"Sign up Now\" on the right side of the page.     You will be asked to enter the access code listed below, as well as some personal information. Please follow the directions to create your username and password.     Your access code is: [email or web address]  Expires: 2018  3:55 PM     Your access code will  in 90 days. If you need help or a new code, please call your [facility] clinic or [phone].        Care EveryWhere ID     This is your Care EveryWhere ID. This could be used by other organizations to access your [facility] medical records  [id number]        Your Vitals Were     Pulse Temperature Respirations Height Last Period Pulse Oximetry    85 99  F (37.2  C) (Tympanic) 16 5' 2\" (1.575 m) 2016 97%    Breastfeeding? BMI (Body Mass Index)                No 24.33 kg/m2           Blood Pressure from Last 3 Encounters:   18 128/80   18 100/66   17 120/78    Weight from Last 3 Encounters:   18 133 lb (60.3 kg)   18 134 lb 8 oz (61 kg)   17 134 lb (60.8 kg)              We Performed the Following     UA reflex to Microscopic     Urine Culture Aerobic Bacterial     Wet prep          Today's Medication Changes          These changes are accurate as of 18  3:55 PM.  If you have any questions, ask your nurse or doctor.               Start taking these medicines.        Dose/Directions    conjugated estrogens cream   Commonly known as:  PREMARIN   Used for:  Vaginal atrophy   Started by:  Ana Eason DO        Dose:  0.5 g   Place 0.5 g vaginally twice a week   Quantity:  5 g "   Refills:  0            Where to get your medicines      These medications were sent to [facility][id number] - [place], [place] - [address] [facility]  [address] [facility] [address]     Phone:  [phone]     conjugated estrogens cream                Primary Care Provider Office Phone # Fax #    Karen Giron PA-C [phone]       [address] [facility]  [facility] [address]        Equal Access to Services     SEEMA GRIFFITH : Hadii aad ku hadasho Soomaali, waaxda luqadaha, qaybta kaalmada adeegyada, waxay idiin hayaan adeeg kharash lacindi . So [facility] [phone].    ATENCIÓN: Si habla español, tiene a hollins disposición servicios gratuitos de asistencia lingüística. [facility] [phone].    We comply with applicable federal civil rights laws and [place] laws. We do not discriminate on the basis of race, color, national origin, age, disability, sex, sexual orientation, or gender identity.            Thank you!     Thank you for choosing [facility]  for your care. Our goal is always to provide you with excellent care. Hearing back from our patients is one way we can continue to improve our services. Please take a few minutes to complete the written survey that you may receive in the mail after your visit with us. Thank you!             Your Updated Medication List - Protect others around you: Learn how to safely use, store and throw away your medicines at www.disposemymeds.org.          This list is accurate as of 4/12/18  3:55 PM.  Always use your most recent med list.                   Brand Name Dispense Instructions for use Diagnosis    ACIDOPHILUS PROBIOTIC BLEND Caps     1 capsule    Take 1 tablet by mouth daily    Fatigue, Flank pain       CALCIUM CITRATE + D3 250-200 MG-UNIT Tabs   Generic drug:  Calcium Citrate-Vitamin D      Take 1 tablet by mouth daily        cholecalciferol 400 UNIT Tabs tablet    vitamin D3     Take 400 Units by  mouth daily        conjugated estrogens cream    PREMARIN    5 g    Place 0.5 g vaginally twice a week    Vaginal atrophy       fluticasone 50 MCG/ACT spray    FLONASE    16 g    INHALE 2 SPRAYS IN EACH NOSTRIL EVERY DAY    Unspecified sinusitis (chronic)       loratadine-pseudoePHEDrine  MG per 24 hr tablet    CLARITIN-D 24-hour    30 tablet    Take 1 tablet by mouth daily.    Maxillary sinusitis, Upper respiratory infection, viral, Rhinitis       methylPREDNISolone 4 MG tablet    MEDROL DOSEPAK     Follow package directions        MYZILRA per tablet   Generic drug:  levonorgestrel-ethinyl estradiol     30 tablet    TAKE 1 TABLET BY MOUTH DAILY    Oral contraceptive use       oxybutynin chloride 15 MG Tb24     90 tablet    Take 1 tablet (15 mg) by mouth daily    Urgency incontinence

## 2018-04-12 NOTE — PATIENT INSTRUCTIONS
Anatomy of the Female Urinary Tract  Your urinary tract helps get rid of urine (your body s liquid waste). The kidneys collect chemicals and water your body doesn t need. This is turned into urine. Urine travels out of the kidneys through the ureters to the bladder. The bladder holds urine until you re ready to release it. The urethra carries urine from the bladder out of the body. The main sphincter muscle circles the mid-urethra.      Front view of female urinary tract.     Date Last Reviewed: 1/1/2017 2000-2017 [employer]. [address] [place] [address]. All rights reserved. This information is not intended as a substitute for professional medical care. Always follow your healthcare professional's instructions.        Atrophic Vaginitis    Atrophic vaginitis means the walls of your vagina have become thin. This happens when your body makes too little of the hormone estrogen. Menopause or surgical removal of the ovaries are the most common causes for a drop in estrogen. Breastfeeding can also cause the hormone level to drop.  Symptoms of atrophic vaginitis include:    Dryness, soreness, burning, or itching in the vagina    Vaginal discharge  Sex can be uncomfortable, even painful. After sex, you may have bleeding from your vagina. You may also have burning or pain when you urinate.  Home care  Your healthcare provider may recommend 1 or more of these as treatment:    Vaginal creams, lotions, and lubricants. These products help relieve vaginal dryness. They don t need a prescription. They can be found in the personal care section of most pharmacies. Creams and lotions are used daily to help keep the vagina moist. Lubricants help reduce dryness and pain during sex. Choose water-based lubricants. Don t use petroleum jelly, mineral oil, or other oils. These increase the chance of infection.     Hormone therapy (HT). HT increases the amount of estrogen in your body. This can help manage  or relieve symptoms. HT can be given in pill form. It may be given as a lotion, cream, ring put into the vagina, or a patch on the skin. The risks and benefits of HT vary for each woman. For instance, your risk may be higher if you have had breast cancer. Discuss this treatment with your healthcare provider. Not every woman can use HT.  You don t need to give up (abstain from) sex. In fact, regular sex can help keep vaginal tissues healthy. Take steps to make sex more comfortable by using water-based lubricants.  Preventing infections  Atrophic vaginitis makes an infection of the vagina or the urinary tract more likely. To help reduce your risk:    Keep your genitals clean. When you bathe, wash the outside of your vagina with mild soap and water. Clean gently between the folds of your vagina.    Wipe from front to back after a bowel movement.    Don t douche unless your healthcare provider tells you to.    Avoid scented toilet paper, scented vaginal sprays, and scented tampons.    Avoid wearing clothes that are tight in the crotch. These include pantyhose, jeans, and leggings. Wear cotton underwear. Change it every day.  Follow-up care  Follow up with your healthcare provider, or as advised.  When to seek medical advice  Call your health care provider right away if any of these occur:    Fever of 100.4 F (38 C) or higher, or as directed by your healthcare provider    Symptoms don t go away or get worse even with treatment    Vaginal area swells or becomes painful    Vaginal area bleeds, but not because of your period    Bad-smelling discharge from the vagina    Pain or burning when you urinate or you have trouble passing urine    Open sores develop around vagina   Date Last Reviewed: 12/1/2016 2000-2017 [employer]. [address] [place] [address], [place] [address]. All rights reserved. This information is not intended as a substitute for professional medical care. Always follow your healthcare  professional's instructions.        Preventing Vaginitis     Use mild, unscented soap when you bathe or shower to avoid irritating your vagina.    Vaginitis is irritation or infection of the vagina or vulva (the outside opening of the vagina). Vaginitis can be caused by bacteria, viruses, parasites, or yeast. Chemicals (such as in perfumes or soaps or in spermicides) can sometimes be a cause. Vaginitis can be caused by hormone changes in pregnancy or with menopause. You can help prevent vaginitis. Follow the tips below. And see your healthcare provider if you have any symptoms.  Hygiene    Avoid chemicals. Do not use vaginal sprays. Do not use scented toilet paper or tampons that are scented. Sprays and scents have chemicals that can irritate your vagina.    Do not douche unless you are told to by your healthcare provider. Douching is rarely needed. And it upsets the normal balance in the vagina.    Wash yourself well. Wash the outer vaginal area (vulva) every day with mild, unscented soap. Keep it as dry as possible.    Wipe correctly. Make sure to wipe from front to back after a bowel movement. This helps keep from spreading bacteria from your anus to your vagina.    Change your tampon often. During your period, make sure to change your tampon as often as directed on the package. This allows the normal flow of vaginal discharge and blood.  Lifestyle    Limit your number of sexual partners. The more partners you have, the greater your risk of infection. Using condoms helps reduce your risk.    Get enough sleep. Sleep helps keep your body s immune system healthy. This helps you fight infection.    Lose weight, if needed. Excess weight can reduce air circulation around your vagina. This can increase your risk of infection.    Exercise regularly. Regular activity helps keep your body healthy.    Take antibiotics only as directed. Antibiotics can change the normal chemical balance in the vagina.    Clothing    Don t  sit in wet clothes. Yeast thrives when it s warm and damp.    Don t wear tight pants. And don t wear tights, leggings, or hose without a cotton crotch. These types of clothing trap warmth and moisture.    Wear cotton underwear. Cotton lets air circulate around the vagina.  Symptoms of vaginitis    Irritation, swelling, or itching of the genital area    Vaginal discharge    Bad vaginal odor    Pain or burning during urination   Date Last Reviewed: 12/1/2016 2000-2017 [employer]. [address] [place] [address], [place] [address]. All rights reserved. This information is not intended as a substitute for professional medical care. Always follow your healthcare professional's instructions.

## 2018-04-12 NOTE — PROGRESS NOTES
"  SUBJECTIVE:   Margarita Quinn is a 52 year old female who presents to clinic today for the following health issues:        URINARY TRACT SYMPTOMS      Duration: X2 weeks    Description  frequency, urgency, back pain and vaginal itchyness    Intensity:  moderate    Accompanying signs and symptoms:  Fever/chills: no   Flank pain YES  Nausea and vomiting: no   Vaginal symptoms: itching and extreme dryness with painful intercouse  Abdominal/Pelvic Pain: YES    History  History of frequent UTI's: no   History of kidney stones: no   Sexually Active: YES  Possibility of pregnancy: No    Precipitating or alleviating factors: None    Therapies tried and outcome: none   Outcome: n/a        Problem list and histories reviewed & adjusted, as indicated.  Additional history: as documented    Labs reviewed in EPIC    Reviewed and updated as needed this visit by clinical staff  Tobacco  Allergies  Meds  Problems  Med Hx  Surg Hx  Fam Hx  Soc Hx        Reviewed and updated as needed this visit by Provider  Allergies  Meds  Problems         ROS:  CONSTITUTIONAL: NEGATIVE for fever, chills, change in weight  INTEGUMENTARY/SKIN: NEGATIVE for worrisome rashes, moles or lesions  EYES: NEGATIVE for vision changes or irritation  ENT/MOUTH: NEGATIVE for ear, mouth and throat problems  RESP: NEGATIVE for significant cough or SOB  CV: NEGATIVE for chest pain, palpitations or peripheral edema  MUSCULOSKELETAL: NEGATIVE for significant arthralgias or myalgia  HEME: NEGATIVE for bleeding problems  PSYCHIATRIC: NEGATIVE for changes in mood or affect    OBJECTIVE:     /80 (BP Location: Left arm, Patient Position: Sitting, Cuff Size: Adult Regular)  Pulse 85  Temp 99  F (37.2  C) (Tympanic)  Resp 16  Ht 5' 2\" (1.575 m)  Wt 133 lb (60.3 kg)  LMP 12/07/2016  SpO2 97%  Breastfeeding? No  BMI 24.33 kg/m2  Body mass index is 24.33 kg/(m^2).   GENERAL: healthy, alert and no distress  EYES: Eyes grossly normal to inspection, PERRL " "and conjunctivae and sclerae normal  HENT: ear canals and TM's normal, nose and mouth without ulcers or lesions  NECK: no adenopathy, no asymmetry, masses, or scars and thyroid normal to palpation  RESP: lungs clear to auscultation - no rales, rhonchi or wheezes  CV: regular rate and rhythm, normal S1 S2, no S3 or S4, no murmur, click or rub, no peripheral edema and peripheral pulses strong  ABDOMEN: soft, nontender, no hepatosplenomegaly, no masses and bowel sounds normal   (female): normal urethral meatus , vaginal mucosal atrophy/dryness.   Normal cervix, adnexae, and uterus without masses.  No vaginal discharge, bleeding or sores present  MS: no gross musculoskeletal defects noted, no edema  SKIN: no suspicious lesions or rashes  NEURO: Normal strength and tone, mentation intact and speech normal  PSYCH: mentation appears normal, affect normal/bright    Diagnostic Test Results:  Wet prep and urinalysis  ASSESSMENT/PLAN:     Problem List Items Addressed This Visit     None      Visit Diagnoses     Vaginal atrophy    -  Primary    Relevant Medications    conjugated estrogens (PREMARIN) cream    Dysuria        Relevant Orders    Urine Culture Aerobic Bacterial    UA reflex to Microscopic (Completed)    Wet prep (Completed)         Wet prep and Urinalysis negative for infection.  Feeling of \"dysuria\" likely due to vaginal atrophy.  Declined STD testing.  Vaginal mucosal dryness/atrophy present on pelvic exam.    Discussed care/management of vaginal atrophy, including the use of lubricants during intercourse to help reduce pain/discomfort.    No personal or family history of breast/uterine or cervical CA.  Denies history of post-menopausal vaginal bleeding.  Rx Premarin cream.  Will RTC as needed.       Ana Eason, DO  [facility]"

## 2018-04-12 NOTE — TELEPHONE ENCOUNTER
Pharmacist from [facility] is calling regarding patient's prescription for premarin cream.With patient's insurance she would have to pay $150 for 3 month supply and pharmacist if appropriate could she be prescribed estrace0.1 mg /gm cream at $45? Told pharmacy that we would get back to them tomorrow as provider has left for the day.

## 2018-04-13 LAB
SPECIMEN SOURCE: NORMAL
WET PREP SPEC: NORMAL

## 2018-04-13 RX ORDER — ESTRADIOL 0.1 MG/G
2 CREAM VAGINAL
Qty: 42.5 G | Refills: 0 | Status: SHIPPED | OUTPATIENT
Start: 2018-04-16 | End: 2018-11-02

## 2018-04-13 NOTE — TELEPHONE ENCOUNTER
Which pharmacy should I send it to (is her pharmacy closed?) and can we pend the medication that is covered so I can sign it?  Thanks!  --Dr. Eason

## 2018-11-02 ENCOUNTER — OFFICE VISIT (OUTPATIENT)
Dept: FAMILY MEDICINE | Facility: CLINIC | Age: 53
End: 2018-11-02
Payer: COMMERCIAL

## 2018-11-02 VITALS
OXYGEN SATURATION: 98 % | HEIGHT: 62 IN | HEART RATE: 78 BPM | BODY MASS INDEX: 24.84 KG/M2 | TEMPERATURE: 98.5 F | SYSTOLIC BLOOD PRESSURE: 128 MMHG | WEIGHT: 135 LBS | DIASTOLIC BLOOD PRESSURE: 84 MMHG | RESPIRATION RATE: 14 BRPM

## 2018-11-02 DIAGNOSIS — K58.2 IRRITABLE BOWEL SYNDROME WITH BOTH CONSTIPATION AND DIARRHEA: ICD-10-CM

## 2018-11-02 DIAGNOSIS — R10.9 STOMACH PAIN: Primary | ICD-10-CM

## 2018-11-02 LAB
BASOPHILS # BLD AUTO: 0 10E9/L (ref 0–0.2)
BASOPHILS NFR BLD AUTO: 0.5 %
DIFFERENTIAL METHOD BLD: NORMAL
EOSINOPHIL # BLD AUTO: 0.2 10E9/L (ref 0–0.7)
EOSINOPHIL NFR BLD AUTO: 3.8 %
ERYTHROCYTE [DISTWIDTH] IN BLOOD BY AUTOMATED COUNT: 12.2 % (ref 10–15)
HCT VFR BLD AUTO: 42.1 % (ref 35–47)
HGB BLD-MCNC: 13.9 G/DL (ref 11.7–15.7)
LYMPHOCYTES # BLD AUTO: 2.3 10E9/L (ref 0.8–5.3)
LYMPHOCYTES NFR BLD AUTO: 36.1 %
MCH RBC QN AUTO: 30.6 PG (ref 26.5–33)
MCHC RBC AUTO-ENTMCNC: 33 G/DL (ref 31.5–36.5)
MCV RBC AUTO: 93 FL (ref 78–100)
MONOCYTES # BLD AUTO: 0.5 10E9/L (ref 0–1.3)
MONOCYTES NFR BLD AUTO: 8.1 %
NEUTROPHILS # BLD AUTO: 3.3 10E9/L (ref 1.6–8.3)
NEUTROPHILS NFR BLD AUTO: 51.5 %
PLATELET # BLD AUTO: 238 10E9/L (ref 150–450)
RBC # BLD AUTO: 4.54 10E12/L (ref 3.8–5.2)
WBC # BLD AUTO: 6.3 10E9/L (ref 4–11)

## 2018-11-02 PROCEDURE — 84443 ASSAY THYROID STIM HORMONE: CPT | Performed by: FAMILY MEDICINE

## 2018-11-02 PROCEDURE — 36415 COLL VENOUS BLD VENIPUNCTURE: CPT | Performed by: FAMILY MEDICINE

## 2018-11-02 PROCEDURE — 99214 OFFICE O/P EST MOD 30 MIN: CPT | Performed by: FAMILY MEDICINE

## 2018-11-02 PROCEDURE — 85025 COMPLETE CBC W/AUTO DIFF WBC: CPT | Performed by: FAMILY MEDICINE

## 2018-11-02 PROCEDURE — 80053 COMPREHEN METABOLIC PANEL: CPT | Performed by: FAMILY MEDICINE

## 2018-11-02 RX ORDER — HYOSCYAMINE SULFATE 0.125 MG
0.125-0.25 TABLET ORAL EVERY 4 HOURS PRN
Qty: 40 TABLET | Refills: 1 | Status: SHIPPED | OUTPATIENT
Start: 2018-11-02 | End: 2019-05-15

## 2018-11-02 NOTE — LETTER
November 3, 2018      Margarita Quinn  [address] [facility] [address]        Dear ,    We are writing to inform you of your test results.    I am happy to inform you that all of your labs are NORMAL, including your thyroid hormone level, liver and kidney functions, electrolytes, and blood counts.    Resulted Orders   TSH with free T4 reflex   Result Value Ref Range    TSH 1.92 0.40 - 4.00 mU/L   CBC with platelets and differential   Result Value Ref Range    WBC 6.3 4.0 - 11.0 10e9/L    RBC Count 4.54 3.8 - 5.2 10e12/L    Hemoglobin 13.9 11.7 - 15.7 g/dL    Hematocrit 42.1 35.0 - 47.0 %    MCV 93 78 - 100 fl    MCH 30.6 26.5 - 33.0 pg    MCHC 33.0 31.5 - 36.5 g/dL    RDW 12.2 10.0 - 15.0 %    Platelet Count 238 150 - 450 10e9/L    Diff Method Automated Method     % Neutrophils 51.5 %    % Lymphocytes 36.1 %    % Monocytes 8.1 %    % Eosinophils 3.8 %    % Basophils 0.5 %    Absolute Neutrophil 3.3 1.6 - 8.3 10e9/L    Absolute Lymphocytes 2.3 0.8 - 5.3 10e9/L    Absolute Monocytes 0.5 0.0 - 1.3 10e9/L    Absolute Eosinophils 0.2 0.0 - 0.7 10e9/L    Absolute Basophils 0.0 0.0 - 0.2 10e9/L   Comprehensive metabolic panel (BMP + Alb, Alk Phos, ALT, AST, Total. Bili, TP)   Result Value Ref Range    Sodium 141 133 - 144 mmol/L    Potassium 4.0 3.4 - 5.3 mmol/L    Chloride 103 94 - 109 mmol/L    Carbon Dioxide 30 20 - 32 mmol/L    Anion Gap 8 3 - 14 mmol/L    Glucose 81 70 - 99 mg/dL    Urea Nitrogen 15 7 - 30 mg/dL    Creatinine 0.58 0.52 - 1.04 mg/dL    GFR Estimate >90 >60 mL/min/1.7m2      Comment:      Non  GFR Calc    GFR Estimate If Black >90 >60 mL/min/1.7m2      Comment:       GFR Calc    Calcium 9.5 8.5 - 10.1 mg/dL    Bilirubin Total 0.3 0.2 - 1.3 mg/dL    Albumin 4.2 3.4 - 5.0 g/dL    Protein Total 7.5 6.8 - 8.8 g/dL    Alkaline Phosphatase 96 40 - 150 U/L    ALT 32 0 - 50 U/L    AST 27 0 - 45 U/L       If you have any questions or concerns, please call the  clinic at the number listed above.       Sincerely,        Ana Eason, DO

## 2018-11-02 NOTE — PROGRESS NOTES
"  SUBJECTIVE:   Margarita Quinn is a 53 year old female who presents to clinic today for the following health issues:    Painful intercourse    Abdominal Pain      Duration: Intermittent and ongoing    Description (location/character/radiation): RLQ pain radiating into the back       Associated flank pain: Yes    Intensity:  6 /10    Accompanying signs and symptoms:        Fever/Chills: no        Gas/Bloating: YES       Nausea/vomitting: no        Diarrhea: YES--alternates between loose, hard, grainy, soft stool       Dysuria or Hematuria: no --urine appears dark in color    History (previous similar pain/trauma/previous testing): No    Precipitating or alleviating factors:       Pain worse with eating/BM/urination: Varies according to what she eats       Pain relieved by BM: YES    Therapies tried and outcome: Pepto/Ranitidine    LMP:  not applicable        Problem list and histories reviewed & adjusted, as indicated.  Additional history: as documented    Labs reviewed in EPIC    Reviewed and updated as needed this visit by clinical staff  Tobacco  Allergies  Meds  Problems  Med Hx  Surg Hx  Fam Hx  Soc Hx        Reviewed and updated as needed this visit by Provider  Allergies  Meds  Problems         ROS:  CONSTITUTIONAL: NEGATIVE for fever, chills, change in weight  INTEGUMENTARY/SKIN: NEGATIVE for worrisome rashes, moles or lesions  EYES: NEGATIVE for vision changes or irritation  ENT/MOUTH: NEGATIVE for ear, mouth and throat problems  RESP: NEGATIVE for significant cough or SOB  CV: NEGATIVE for chest pain, palpitations or peripheral edema  MUSCULOSKELETAL: NEGATIVE for significant arthralgias or myalgia  NEURO: NEGATIVE for weakness, dizziness or paresthesias  HEME: NEGATIVE for bleeding problems    OBJECTIVE:     /84 (Cuff Size: Adult Regular)  Pulse 78  Temp 98.5  F (36.9  C) (Tympanic)  Resp 14  Ht 5' 2\" (1.575 m)  Wt 135 lb (61.2 kg)  LMP 12/07/2016  SpO2 98%  Breastfeeding? No  BMI " 24.69 kg/m2  Body mass index is 24.69 kg/(m^2).   GENERAL: healthy, alert and no distress  EYES: Eyes grossly normal to inspection, PERRL and conjunctivae and sclerae normal  HENT: ear canals and TM's normal, nose and mouth without ulcers or lesions  NECK: no adenopathy, no asymmetry, masses, or scars and thyroid normal to palpation  RESP: lungs clear to auscultation - no rales, rhonchi or wheezes  CV: regular rate and rhythm, normal S1 S2, no S3 or S4, no murmur, click or rub, no peripheral edema and peripheral pulses strong  ABDOMEN: tenderness diffusely with moderate palpation.  No organomegaly or masses, liver span normal to percussion, bowel sounds normal, umbilicus normal, no bruits heard and no striae, dilated veins, rashes, or lesions  MS: no gross musculoskeletal defects noted, no edema  SKIN: no suspicious lesions or rashes  NEURO: Normal strength and tone, mentation intact and speech normal  PSYCH: mentation appears normal, affect normal/bright    Diagnostic Test Results:  CBC, CMP, TSH/T4  ASSESSMENT/PLAN:     Problem List Items Addressed This Visit     None      Visit Diagnoses     Stomach pain    -  Primary    Relevant Orders    GASTROENTEROLOGY ADULT REF CONSULT ONLY    TSH with free T4 reflex    CBC with platelets and differential    Comprehensive metabolic panel (BMP + Alb, Alk Phos, ALT, AST, Total. Bili, TP)    Irritable bowel syndrome with both constipation and diarrhea        Relevant Medications    hyoscyamine (ANASPAZ/LEVSIN) 0.125 MG tablet    Other Relevant Orders    GASTROENTEROLOGY ADULT REF CONSULT ONLY    TSH with free T4 reflex    CBC with platelets and differential    Comprehensive metabolic panel (BMP + Alb, Alk Phos, ALT, AST, Total. Bili, TP)         Labs--CBC, CMP, TSH/T4.  Referral to GI for ongoing/worsening IBS symptoms and to get screening (or diagnostic) Colonoscopy since she is due.  Agreeable to try Levsin as needed for IBS.    IBS care/management discussed; handout provided  as well.     Ana Eason, [facility]

## 2018-11-02 NOTE — PATIENT INSTRUCTIONS
Diet and Lifestyle Tips for Irritable Bowel Syndrome (IBS)    Your healthcare professional may suggest some lifestyle changes to help control your IBS. Changing your diet and managing stress are two of the most important. Follow your healthcare provider s instructions and try some of the suggestions below.  Change your diet  Your diet may be an important cause of IBS symptoms. You may want to try the following:    Pay attention to what foods bother you, and avoid them. For example, dairy products are hard for some people to digest.    Drink 6 to 8 glasses of water a day.    Avoid caffeine and tobacco. These are muscle stimulants and can affect the working of your digestive tract.    Avoid alcohol, which can irritate your digestive tract and make your symptoms worse.    Eat more fiber if constipation is a problem. Fiber makes the stool softer and easier to pass through the colon.  Reduce stress  If stress or anxiety makes your IBS symptoms worse, learning how to manage stress may help you feel better. Try these tips:    Identify the causes of stress in your life.    Learn new ways to cope with them.    Regular exercise is a great way to relieve stress. It can also help ease constipation.  Date Last Reviewed: 7/1/2016 2000-2017 [employer]. [address] [place] [address]. All rights reserved. This information is not intended as a substitute for professional medical care. Always follow your healthcare professional's instructions.        Abdominal Pain    Abdominal pain is pain in the stomach or belly area. Everyone has this pain from time to time. In many cases it goes away on its own. But abdominal pain can sometimes be due to a serious problem, such as appendicitis. So it s important to know when to seek help.  Causes of abdominal pain  There are many possible causes of abdominal pain. Common causes in adults include:    Constipation, diarrhea, or gas    Stomach acid flowing back up into  the esophagus (acid reflux or heartburn)    Severe acid reflux, called GERD (gastroesophageal reflux disease)    A sore in the lining of the stomach or small intestine (peptic ulcer)    Inflammation of the gallbladder, liver, or pancreas    Gallstones or kidney stones    Appendicitis     Intestinal blockage     An internal organ pushing through a muscle or other tissue (hernia)    Urinary tract infections    In women, menstrual cramps, fibroids, or endometriosis    Inflammation or infection of the intestines  Diagnosing the cause of abdominal pain  Your healthcare provider will do a physical exam help find the cause of your pain. If needed, tests will be ordered. Belly pain has many possible causes. So it can be hard to find the reason for your pain. Giving details about your pain can help. Tell your provider where and when you feel the pain, and what makes it better or worse. Also let your provider know if you have other symptoms such as:    Fever    Tiredness    Upset stomach (nausea)    Vomiting    Changes in bathroom habits  Treating abdominal pain  Some causes of pain need emergency medical treatment right away. These include appendicitis or a bowel blockage. Other problems can be treated with rest, fluids, or medicines. Your healthcare provider can give you specific instructions for treatment or self-care based on what is causing your pain.  If you have vomiting or diarrhea, sip water or other clear fluids. When you are ready to eat solid foods again, start with small amounts of easy-to-digest, low-fat foods. These include apple sauce, toast, or crackers.   When to seek medical care  Call 911 or go to the hospital right away if you:    Can t pass stool and are vomiting    Are vomiting blood or have bloody diarrhea or black, tarry diarrhea    Have chest, neck, or shoulder pain    Feel like you might pass out    Have pain in your shoulder blades with nausea    Have sudden, severe belly pain    Have new,  severe pain unlike any you have felt before    Have a belly that is rigid, hard, and tender to touch  Call your healthcare provider if you have:    Pain for more than 5 days    Bloating for more than 2 days    Diarrhea for more than 5 days    A fever of 100.4 F (38 C) or higher, or as directed by your healthcare provider    Pain that gets worse    Weight loss for no reason    Continued lack of appetite    Blood in your stool  How to prevent abdominal pain  Here are some tips to help prevent abdominal pain:    Eat smaller amounts of food at one time.    Avoid greasy, fried, or other high-fat foods.    Avoid foods that give you gas.    Exercise regularly.    Drink plenty of fluids.  To help prevent GERD symptoms:    Quit smoking.    Reduce alcohol and certain foods that increase stomach acid.    Avoid aspirin and over-the-counter pain and fever medicines (NSAIDS or nonsteroidal anti-inflammatory drugs), if possible    Lose extra weight.    Finish eating at least 2 hours before you go to bed or lie down.    Raise the head of your bed.  Date Last Reviewed: 7/1/2016 2000-2017 [employer]. [address] [place] [address], [place] [address]. All rights reserved. This information is not intended as a substitute for professional medical care. Always follow your healthcare professional's instructions.

## 2018-11-02 NOTE — MR AVS SNAPSHOT
After Visit Summary   11/2/2018    Margarita Quinn    MRN: [id number]           Patient Information     Date Of Birth          1965        Visit Information        Provider Department      11/2/2018 3:30 PM Ana Eason DO [facility]        Today's Diagnoses     Stomach pain    -  1    Irritable bowel syndrome with both constipation and diarrhea          Care Instructions      Diet and Lifestyle Tips for Irritable Bowel Syndrome (IBS)    Your healthcare professional may suggest some lifestyle changes to help control your IBS. Changing your diet and managing stress are two of the most important. Follow your healthcare provider s instructions and try some of the suggestions below.  Change your diet  Your diet may be an important cause of IBS symptoms. You may want to try the following:    Pay attention to what foods bother you, and avoid them. For example, dairy products are hard for some people to digest.    Drink 6 to 8 glasses of water a day.    Avoid caffeine and tobacco. These are muscle stimulants and can affect the working of your digestive tract.    Avoid alcohol, which can irritate your digestive tract and make your symptoms worse.    Eat more fiber if constipation is a problem. Fiber makes the stool softer and easier to pass through the colon.  Reduce stress  If stress or anxiety makes your IBS symptoms worse, learning how to manage stress may help you feel better. Try these tips:    Identify the causes of stress in your life.    Learn new ways to cope with them.    Regular exercise is a great way to relieve stress. It can also help ease constipation.  Date Last Reviewed: 7/1/2016 2000-2017 [employer]. [address] [place] [address], [place] [address]. All rights reserved. This information is not intended as a substitute for professional medical care. Always follow your healthcare professional's instructions.        Abdominal Pain    Abdominal  pain is pain in the stomach or belly area. Everyone has this pain from time to time. In many cases it goes away on its own. But abdominal pain can sometimes be due to a serious problem, such as appendicitis. So it s important to know when to seek help.  Causes of abdominal pain  There are many possible causes of abdominal pain. Common causes in adults include:    Constipation, diarrhea, or gas    Stomach acid flowing back up into the esophagus (acid reflux or heartburn)    Severe acid reflux, called GERD (gastroesophageal reflux disease)    A sore in the lining of the stomach or small intestine (peptic ulcer)    Inflammation of the gallbladder, liver, or pancreas    Gallstones or kidney stones    Appendicitis     Intestinal blockage     An internal organ pushing through a muscle or other tissue (hernia)    Urinary tract infections    In women, menstrual cramps, fibroids, or endometriosis    Inflammation or infection of the intestines  Diagnosing the cause of abdominal pain  Your healthcare provider will do a physical exam help find the cause of your pain. If needed, tests will be ordered. Belly pain has many possible causes. So it can be hard to find the reason for your pain. Giving details about your pain can help. Tell your provider where and when you feel the pain, and what makes it better or worse. Also let your provider know if you have other symptoms such as:    Fever    Tiredness    Upset stomach (nausea)    Vomiting    Changes in bathroom habits  Treating abdominal pain  Some causes of pain need emergency medical treatment right away. These include appendicitis or a bowel blockage. Other problems can be treated with rest, fluids, or medicines. Your healthcare provider can give you specific instructions for treatment or self-care based on what is causing your pain.  If you have vomiting or diarrhea, sip water or other clear fluids. When you are ready to eat solid foods again, start with small amounts of  easy-to-digest, low-fat foods. These include apple sauce, toast, or crackers.   When to seek medical care  Call 911 or go to the hospital right away if you:    Can t pass stool and are vomiting    Are vomiting blood or have bloody diarrhea or black, tarry diarrhea    Have chest, neck, or shoulder pain    Feel like you might pass out    Have pain in your shoulder blades with nausea    Have sudden, severe belly pain    Have new, severe pain unlike any you have felt before    Have a belly that is rigid, hard, and tender to touch  Call your healthcare provider if you have:    Pain for more than 5 days    Bloating for more than 2 days    Diarrhea for more than 5 days    A fever of 100.4 F (38 C) or higher, or as directed by your healthcare provider    Pain that gets worse    Weight loss for no reason    Continued lack of appetite    Blood in your stool  How to prevent abdominal pain  Here are some tips to help prevent abdominal pain:    Eat smaller amounts of food at one time.    Avoid greasy, fried, or other high-fat foods.    Avoid foods that give you gas.    Exercise regularly.    Drink plenty of fluids.  To help prevent GERD symptoms:    Quit smoking.    Reduce alcohol and certain foods that increase stomach acid.    Avoid aspirin and over-the-counter pain and fever medicines (NSAIDS or nonsteroidal anti-inflammatory drugs), if possible    Lose extra weight.    Finish eating at least 2 hours before you go to bed or lie down.    Raise the head of your bed.  Date Last Reviewed: 7/1/2016 2000-2017 [employer]. [address] [place] [address], [place] [address]. All rights reserved. This information is not intended as a substitute for professional medical care. Always follow your healthcare professional's instructions.                Follow-ups after your visit        Additional Services     GASTROENTEROLOGY ADULT REF CONSULT ONLY       Preferred Location: [facility] [phone]      Please be aware that coverage  "of these services is subject to the terms and limitations of your health insurance plan.  Call member services at your health plan with any benefit or coverage questions.  Any procedures must be performed at a [facility] facility OR coordinated by your clinic's referral office.    Please bring the following with you to your appointment:    (1) Any X-Rays, CTs or MRIs which have been performed.  Contact the facility where they were done to arrange for  prior to your scheduled appointment.    (2) List of current medications   (3) This referral request   (4) Any documents/labs given to you for this referral                  Follow-up notes from your care team     Return in about 4 weeks (around 11/30/2018) for If Not Getting Any Better.      Who to contact     If you have questions or need follow up information about today's clinic visit or your schedule please contact [facility] directly at [phone].  Normal or non-critical lab and imaging results will be communicated to you by MyChart, letter or phone within 4 business days after the clinic has received the results. If you do not hear from us within 7 days, please contact the clinic through MyChart or phone. If you have a critical or abnormal lab result, we will notify you by phone as soon as possible.  Submit refill requests through [employer] or call your pharmacy and they will forward the refill request to us. Please allow 3 business days for your refill to be completed.          Additional Information About Your Visit        Care EveryWhere ID     This is your Care EveryWhere ID. This could be used by other organizations to access your [facility] medical records  [id number]        Your Vitals Were     Pulse Temperature Respirations Height Last Period Pulse Oximetry    78 98.5  F (36.9  C) (Tympanic) 14 5' 2\" (1.575 m) 12/07/2016 98%    Breastfeeding? BMI (Body Mass Index)                No 24.69 kg/m2           Blood Pressure " from Last 3 Encounters:   11/02/18 128/84   04/12/18 128/80   01/26/18 100/66    Weight from Last 3 Encounters:   11/02/18 135 lb (61.2 kg)   04/12/18 133 lb (60.3 kg)   01/26/18 134 lb 8 oz (61 kg)              We Performed the Following     CBC with platelets and differential     Comprehensive metabolic panel (BMP + Alb, Alk Phos, ALT, AST, Total. Bili, TP)     GASTROENTEROLOGY ADULT REF CONSULT ONLY     TSH with free T4 reflex          Today's Medication Changes          These changes are accurate as of 11/2/18  4:11 PM.  If you have any questions, ask your nurse or doctor.               Start taking these medicines.        Dose/Directions    hyoscyamine 0.125 MG tablet   Commonly known as:  ANASPAZ/LEVSIN   Used for:  Irritable bowel syndrome with both constipation and diarrhea   Started by:  Ana Eason DO        Dose:  0.125-0.25 mg   Take 1-2 tablets (125-250 mcg) by mouth every 4 hours as needed for cramping   Quantity:  40 tablet   Refills:  1         Stop taking these medicines if you haven't already. Please contact your care team if you have questions.     conjugated estrogens cream   Commonly known as:  PREMARIN   Stopped by:  Ana Eason DO           estradiol 0.1 MG/GM cream   Commonly known as:  ESTRACE VAGINAL   Stopped by:  Ana Eason DO           methylPREDNISolone 4 MG tablet   Commonly known as:  MEDROL DOSEPAK   Stopped by:  Ana Eason DO           MYZILRA per tablet   Generic drug:  levonorgestrel-ethinyl estradiol   Stopped by:  Ana Eason DO                Where to get your medicines      These medications were [facility] to [id number] - [place] [place] [facility] [address] [facility]  [address] [facility] [place] [address]     Phone:  [phone]     hyoscyamine 0.125 MG tablet                Primary Care Provider Office Phone # Fax #    Karen Giron PA-C [phone] [address]  [facility] [address]        Equal Access to Services     VIPIN GRIFFITH : Hadii johnathon osborne queta Hawkins, wanancyda luqadaha, qaybta kaalmajustus ricksfangadrian diane. So [facility] [phone].    ATENCIÓN: Si habla español, tiene a hollins disposición servicios gratuitos de asistencia lingüística. Christie al [phone].    We comply with applicable federal civil rights laws and [place] laws. We do not discriminate on the basis of race, color, national origin, age, disability, sex, sexual orientation, or gender identity.            Thank you!     Thank you for choosing [facility]  for your care. Our goal is always to provide you with excellent care. Hearing back from our patients is one way we can continue to improve our services. Please take a few minutes to complete the written survey that you may receive in the mail after your visit with us. Thank you!             Your Updated Medication List - Protect others around you: Learn how to safely use, store and throw away your medicines at www.disposemymeds.org.          This list is accurate as of 11/2/18  4:11 PM.  Always use your most recent med list.                   Brand Name Dispense Instructions for use Diagnosis    CALCIUM CITRATE + D3 250-200 MG-UNIT Tabs   Generic drug:  Calcium Citrate-Vitamin D      Take 1 tablet by mouth daily        cholecalciferol 400 UNIT Tabs tablet    vitamin D3     Take 400 Units by mouth daily        fluticasone 50 MCG/ACT spray    FLONASE    16 g    INHALE 2 SPRAYS IN EACH NOSTRIL EVERY DAY    Unspecified sinusitis (chronic)       hyoscyamine 0.125 MG tablet    ANASPAZ/LEVSIN    40 tablet    Take 1-2 tablets (125-250 mcg) by mouth every 4 hours as needed for cramping    Irritable bowel syndrome with both constipation and diarrhea       loratadine-pseudoePHEDrine  MG per 24 hr tablet    CLARITIN-D 24-hour    30 tablet    Take 1 tablet by mouth daily.    Maxillary sinusitis,  Upper respiratory infection, viral, Rhinitis       oxybutynin chloride 15 MG Tb24     90 tablet    Take 1 tablet (15 mg) by mouth daily    Urgency incontinence

## 2018-11-03 LAB
ALBUMIN SERPL-MCNC: 4.2 G/DL (ref 3.4–5)
ALP SERPL-CCNC: 96 U/L (ref 40–150)
ALT SERPL W P-5'-P-CCNC: 32 U/L (ref 0–50)
ANION GAP SERPL CALCULATED.3IONS-SCNC: 8 MMOL/L (ref 3–14)
AST SERPL W P-5'-P-CCNC: 27 U/L (ref 0–45)
BILIRUB SERPL-MCNC: 0.3 MG/DL (ref 0.2–1.3)
BUN SERPL-MCNC: 15 MG/DL (ref 7–30)
CALCIUM SERPL-MCNC: 9.5 MG/DL (ref 8.5–10.1)
CHLORIDE SERPL-SCNC: 103 MMOL/L (ref 94–109)
CO2 SERPL-SCNC: 30 MMOL/L (ref 20–32)
CREAT SERPL-MCNC: 0.58 MG/DL (ref 0.52–1.04)
GFR SERPL CREATININE-BSD FRML MDRD: >90 ML/MIN/1.7M2
GLUCOSE SERPL-MCNC: 81 MG/DL (ref 70–99)
POTASSIUM SERPL-SCNC: 4 MMOL/L (ref 3.4–5.3)
PROT SERPL-MCNC: 7.5 G/DL (ref 6.8–8.8)
SODIUM SERPL-SCNC: 141 MMOL/L (ref 133–144)
TSH SERPL DL<=0.005 MIU/L-ACNC: 1.92 MU/L (ref 0.4–4)

## 2019-01-18 ENCOUNTER — OFFICE VISIT (OUTPATIENT)
Dept: FAMILY MEDICINE | Facility: CLINIC | Age: 54
End: 2019-01-18
Payer: COMMERCIAL

## 2019-01-18 VITALS
OXYGEN SATURATION: 98 % | HEART RATE: 72 BPM | BODY MASS INDEX: 25.12 KG/M2 | WEIGHT: 136.5 LBS | DIASTOLIC BLOOD PRESSURE: 72 MMHG | SYSTOLIC BLOOD PRESSURE: 118 MMHG | HEIGHT: 62 IN | RESPIRATION RATE: 14 BRPM | TEMPERATURE: 98.4 F

## 2019-01-18 DIAGNOSIS — Z12.11 SPECIAL SCREENING FOR MALIGNANT NEOPLASMS, COLON: ICD-10-CM

## 2019-01-18 DIAGNOSIS — J32.0 CHRONIC MAXILLARY SINUSITIS: Primary | ICD-10-CM

## 2019-01-18 PROCEDURE — 99214 OFFICE O/P EST MOD 30 MIN: CPT | Performed by: FAMILY MEDICINE

## 2019-01-18 ASSESSMENT — MIFFLIN-ST. JEOR: SCORE: 1182.41

## 2019-01-20 PROCEDURE — 82274 ASSAY TEST FOR BLOOD FECAL: CPT | Performed by: FAMILY MEDICINE

## 2019-01-20 NOTE — PROGRESS NOTES
"  SUBJECTIVE:   Margarita Quinn is a 53 year old female who presents to clinic today for the following health issues:        Sinus Problem      Duration: X4 weeks    Description (location/character/radiation): Sinus congestion, facial pressure, headache    Intensity:  moderate    Accompanying signs and symptoms: See above    History (similar episodes/previous evaluation): Has had previous sinus infections    Precipitating or alleviating factors: None    Therapies tried and outcome: Vicks/dayquil/nyquil           Problem list and histories reviewed & adjusted, as indicated.  Additional history: as documented    Labs reviewed in EPIC    Reviewed and updated as needed this visit by clinical staff  Tobacco  Allergies  Meds  Med Hx  Surg Hx  Fam Hx  Soc Hx      Reviewed and updated as needed this visit by Provider  Allergies         ROS:  C: NEGATIVE for fever, chills, change in weight  I: NEGATIVE for worrisome rashes, moles or lesions  E: NEGATIVE for vision changes or irritation  CV: NEGATIVE for chest pain, palpitations or peripheral edema  GI: NEGATIVE for nausea, abdominal pain, heartburn, or change in bowel habits  M: NEGATIVE for significant arthralgias or myalgia  H: NEGATIVE for bleeding problems      OBJECTIVE:     /72 (Cuff Size: Adult Regular)   Pulse 72   Temp 98.4  F (36.9  C) (Tympanic)   Resp 14   Ht 1.575 m (5' 2\")   Wt 61.9 kg (136 lb 8 oz)   LMP 12/07/2016   SpO2 98%   BMI 24.97 kg/m    Body mass index is 24.97 kg/m .   GENERAL: alert and no distress  EYES: Eyes grossly normal to inspection, PERRL and conjunctivae and sclerae normal  HENT: ear canals and TM's normal, mouth without ulcers or lesions.  +b/l boggy turbinates, no active nasal drainage.  NECK: no adenopathy, no asymmetry, masses, or scars and thyroid normal to palpation  RESP: lungs clear to auscultation - no rales, rhonchi or wheezes  CV: regular rate and rhythm, normal S1 S2, no S3 or S4, no murmur, click or rub, no " peripheral edema and peripheral pulses strong  SKIN: no suspicious lesions or rashes      Diagnostic Test Results:  FIT test  ASSESSMENT/PLAN:     Problem List Items Addressed This Visit     Chronic maxillary sinusitis - Primary    Relevant Medications    amoxicillin-clavulanate (AUGMENTIN) 875-125 MG tablet      Other Visit Diagnoses     Special screening for malignant neoplasms, colon        Relevant Orders    Fecal colorectal cancer screen (FIT)         --push fluids, rest, and symptomatic treatment as needed:  Mucinex 600 mg 2 tabs bid  Increase humidity to 30-40% in bedroom at night - vaporizer  Saline nasal spray as needed  Benadryl 25mg 1/2 - 1 hour before bed time  Maintain 8 hr minimum of sleep at night  Robitussin for cough  Will return to clinic as needed.  See Patient Instructions for details and follow-up instructions    --Agreeable to complete a FIT test for colon cancer screening.  Understands that a positive result (+blood) would require a colonoscopy.     Ana Eason, DO  [facility]

## 2019-01-26 DIAGNOSIS — Z12.11 SPECIAL SCREENING FOR MALIGNANT NEOPLASMS, COLON: ICD-10-CM

## 2019-01-26 LAB — HEMOCCULT STL QL IA: NEGATIVE

## 2019-04-01 ENCOUNTER — TRANSFERRED RECORDS (OUTPATIENT)
Dept: HEALTH INFORMATION MANAGEMENT | Facility: CLINIC | Age: 54
End: 2019-04-01

## 2019-04-01 LAB — PAP SMEAR - HIM PATIENT REPORTED: NEGATIVE

## 2019-05-15 ENCOUNTER — OFFICE VISIT (OUTPATIENT)
Dept: FAMILY MEDICINE | Facility: CLINIC | Age: 54
End: 2019-05-15
Payer: COMMERCIAL

## 2019-05-15 VITALS
RESPIRATION RATE: 14 BRPM | OXYGEN SATURATION: 98 % | DIASTOLIC BLOOD PRESSURE: 86 MMHG | TEMPERATURE: 98.4 F | HEART RATE: 77 BPM | HEIGHT: 62 IN | SYSTOLIC BLOOD PRESSURE: 124 MMHG | WEIGHT: 138 LBS | BODY MASS INDEX: 25.4 KG/M2

## 2019-05-15 DIAGNOSIS — J32.0 CHRONIC MAXILLARY SINUSITIS: Primary | ICD-10-CM

## 2019-05-15 DIAGNOSIS — J02.9 SORE THROAT: ICD-10-CM

## 2019-05-15 LAB
DEPRECATED S PYO AG THROAT QL EIA: NORMAL
SPECIMEN SOURCE: NORMAL

## 2019-05-15 PROCEDURE — 87081 CULTURE SCREEN ONLY: CPT | Performed by: FAMILY MEDICINE

## 2019-05-15 PROCEDURE — 99213 OFFICE O/P EST LOW 20 MIN: CPT | Performed by: FAMILY MEDICINE

## 2019-05-15 PROCEDURE — 87880 STREP A ASSAY W/OPTIC: CPT | Performed by: FAMILY MEDICINE

## 2019-05-15 RX ORDER — ESTRADIOL 10 UG/1
10 INSERT VAGINAL
COMMUNITY
End: 2019-10-14

## 2019-05-15 ASSESSMENT — MIFFLIN-ST. JEOR: SCORE: 1189.21

## 2019-05-15 NOTE — LETTER
May 16, 2019      Margarita Quinn  [address] [facility] [address]        Dear ,    Your strep culture is NEGATIVE which confirms that you do not have a strep throat infection.  I hope you are feeling better, Margarita.    Please let me know if you have any questions or concerns.      Resulted Orders   Strep, Rapid Screen   Result Value Ref Range    Specimen Description Throat     Rapid Strep A Screen       NEGATIVE: No Group A streptococcal antigen detected by immunoassay, await culture report.   Beta strep group A culture   Result Value Ref Range    Specimen Description Throat     Culture Micro No beta hemolytic Streptococcus Group A isolated        If you have any questions or concerns, please call the clinic at the number listed above.       Sincerely,        Ana Eason, DO

## 2019-05-15 NOTE — Clinical Note
Please abstract the following data from this visit with this patient into the appropriate field in Epic:Pap smear done on this date: April 2019 (approximately), by this group:[facility] ([facility]), results were NORMAL per pt report

## 2019-05-15 NOTE — PROGRESS NOTES
"  SUBJECTIVE:   Margarita Quinn is a 53 year old female who presents to clinic today for the following   health issues:        URI SYMPTOMS      Duration: X3 week    Description  nasal congestion, sore throat, facial pain/pressure and cough    Severity: moderate    Accompanying signs and symptoms: None    History (predisposing factors):  none    Precipitating or alleviating factors: None    Therapies tried and outcome:  Sinus pressure medication    Sore throat started about 1 week ago.  Works in a ; wants to make sure she does not have strep.      Additional history: as documented    Reviewed  and updated as needed this visit by clinical staff  Tobacco  Allergies  Meds  Problems  Med Hx  Surg Hx  Fam Hx  Soc Hx          Reviewed and updated as needed this visit by Provider  Tobacco  Allergies  Meds  Problems  Med Hx  Surg Hx  Fam Hx         Labs reviewed in EPIC    ROS:  C: NEGATIVE for fever, chills, change in weight  I: NEGATIVE for worrisome rashes, moles or lesions  E: NEGATIVE for vision changes or irritation  CV: NEGATIVE for chest pain, palpitations or peripheral edema  GI: NEGATIVE for nausea, abdominal pain, heartburn, or change in bowel habits  M: NEGATIVE for significant arthralgias or myalgia  H: NEGATIVE for bleeding problems      OBJECTIVE:     /86 (Patient Position: Sitting, Cuff Size: Adult Regular)   Pulse 77   Temp 98.4  F (36.9  C) (Tympanic)   Resp 14   Ht 1.575 m (5' 2\")   Wt 62.6 kg (138 lb)   LMP 12/07/2016   SpO2 98%   Breastfeeding? No   BMI 25.24 kg/m    Body mass index is 25.24 kg/m .   GENERAL: alert and no distress  EYES: Eyes grossly normal to inspection, PERRL and conjunctivae and sclerae normal  HENT: ear canals and TM's normal, mouth without ulcers or lesions.  +b/l boggy turbinates, no active nasal drainage.  NECK: no adenopathy, no asymmetry, masses, or scars and thyroid normal to palpation  RESP: lungs clear to auscultation - no rales, rhonchi or " wheezes  CV: regular rate and rhythm, normal S1 S2, no S3 or S4, no murmur, click or rub, no peripheral edema and peripheral pulses strong  SKIN: no suspicious lesions or rashes      Diagnostic Test Results:  Rapid strep, Strep cx  ASSESSMENT/PLAN:     Problem List Items Addressed This Visit     Chronic maxillary sinusitis - Primary    Relevant Medications    amoxicillin-clavulanate (AUGMENTIN) 875-125 MG tablet      Other Visit Diagnoses     Sore throat        Relevant Orders    Strep, Rapid Screen (Completed)         1.  Sinusitis:  Rapid strep negative, strep culture pending.  Rx Augmentin o cover for sinus infection and possible strep  --push fluids, rest, and symptomatic treatment as needed:  Mucinex 600 mg 2 tabs bid  Increase humidity to 30-40% in bedroom at night - vaporizer  Saline nasal spray as needed  Benadryl 25mg 1/2 - 1 hour before bed time  Maintain 8 hr minimum of sleep at night  Robitussin for cough  Will return to clinic as needed.  See Patient Instructions for details and follow-up instructions  Encouraged her to f/u with ENT due to her frequent Sinus infections.    2.  Got pap Smear by her OBGYN in April 2019 and it was normal per pt report.   Result requested to be abstracted.    Ana Eason, DO  [facility]

## 2019-05-16 LAB
BACTERIA SPEC CULT: NORMAL
SPECIMEN SOURCE: NORMAL

## 2019-05-25 DIAGNOSIS — N39.41 URGENCY INCONTINENCE: Chronic | ICD-10-CM

## 2019-05-28 RX ORDER — OXYBUTYNIN CHLORIDE 15 MG/1
TABLET, EXTENDED RELEASE ORAL
Qty: 90 TABLET | Refills: 0 | Status: SHIPPED | OUTPATIENT
Start: 2019-05-28 | End: 2019-08-14

## 2019-05-28 NOTE — TELEPHONE ENCOUNTER
"Requested Prescriptions   Pending Prescriptions Disp Refills     oxybutynin ER (DITROPAN XL) 15 MG 24 hr tablet [Pharmacy Med Name: Oxybutynin Chloride ER Oral Tablet Extended Release 24 Hour 15 MG]  Last Written Prescription Date:  10/29/2018  Last Fill Quantity: 90 tablet,  # refills: 1   Last office visit: 5/15/2019 with prescribing provider:  Yumi   Future Office Visit:     90 tablet 0     Sig: Take 1 tablet (15 mg) by mouth daily       Muscarinic Antagonists (Urinary Incontinence Agents) Passed - 5/25/2019 11:17 AM        Passed - Recent (12 mo) or future (30 days) visit within the authorizing provider's specialty     Patient had office visit in the last 12 months or has a visit in the next 30 days with authorizing provider or within the authorizing provider's specialty.  See \"Patient Info\" tab in inbasket, or \"Choose Columns\" in Meds & Orders section of the refill encounter.              Passed - Medication is Oxybutynin and patient is 5 years of age or older        Passed - Patient does not have a diagnosis of glaucoma on the problem list     If glaucoma diagnosis is new, refer refill to physician.          Passed - Medication is active on med list        Passed - Patient is 18 years of age or older           "

## 2019-07-27 ENCOUNTER — OFFICE VISIT (OUTPATIENT)
Dept: FAMILY MEDICINE | Facility: CLINIC | Age: 54
End: 2019-07-27
Payer: COMMERCIAL

## 2019-07-27 ENCOUNTER — ANCILLARY PROCEDURE (OUTPATIENT)
Dept: GENERAL RADIOLOGY | Facility: CLINIC | Age: 54
End: 2019-07-27
Attending: PHYSICIAN ASSISTANT
Payer: COMMERCIAL

## 2019-07-27 VITALS
WEIGHT: 136.5 LBS | RESPIRATION RATE: 16 BRPM | DIASTOLIC BLOOD PRESSURE: 76 MMHG | TEMPERATURE: 98.2 F | BODY MASS INDEX: 24.97 KG/M2 | OXYGEN SATURATION: 97 % | HEART RATE: 84 BPM | SYSTOLIC BLOOD PRESSURE: 102 MMHG

## 2019-07-27 DIAGNOSIS — L72.9 BENIGN SKIN CYST: ICD-10-CM

## 2019-07-27 DIAGNOSIS — K44.9 HIATAL HERNIA: ICD-10-CM

## 2019-07-27 DIAGNOSIS — K21.9 GASTROESOPHAGEAL REFLUX DISEASE WITHOUT ESOPHAGITIS: ICD-10-CM

## 2019-07-27 DIAGNOSIS — R07.89 CHEST PRESSURE: Primary | ICD-10-CM

## 2019-07-27 DIAGNOSIS — R07.89 CHEST PRESSURE: ICD-10-CM

## 2019-07-27 PROCEDURE — 71046 X-RAY EXAM CHEST 2 VIEWS: CPT | Mod: FY

## 2019-07-27 PROCEDURE — 99214 OFFICE O/P EST MOD 30 MIN: CPT | Performed by: PHYSICIAN ASSISTANT

## 2019-07-27 PROCEDURE — 93000 ELECTROCARDIOGRAM COMPLETE: CPT | Performed by: PHYSICIAN ASSISTANT

## 2019-07-27 RX ORDER — PANTOPRAZOLE SODIUM 20 MG/1
20 TABLET, DELAYED RELEASE ORAL DAILY
Qty: 90 TABLET | Refills: 0 | Status: SHIPPED | OUTPATIENT
Start: 2019-07-27 | End: 2020-05-22

## 2019-07-27 NOTE — PATIENT INSTRUCTIONS
Diet recommendations:    Drink 6-8 glasses of water daily    Eat high-fiber, low-fat foods (fruits, vegetables, whole-grain breads & cereals)    Avoid carbonated beverages (soda), alcohol, caffeine    Avoid high-fat and spicy foods    Avoid smoking    Some foods (beans, cabbage, cauliflower) naturally produce gas and should be limited or avoided.    Eat smaller, more frequent meals, as they are easier to digest than large ones    Avoid eating large meals several hours prior to bedtime

## 2019-07-27 NOTE — PROGRESS NOTES
Subjective     Margarita Quinn is a 53 year old female who presents to clinic today for the following health issues:    HPI     Additional concerns: spot on LT arm    Concern - gas  Onset: several months, worse in the last week    Description:   Fills like chest is full of gas or air-tender    Intensity: mild    Progression of Symptoms:  worsening and intermittent    Accompanying Signs & Symptoms:  Chest flutters, belching, fatigue, body aches in chest    Previous history of similar problem:   none    Precipitating factors:   Worsened by: none    Alleviating factors:  Improved by: none    Therapies Tried and outcome: none    - Hx hiatal hernia (diagnosed on endoscopy years ago) and GERD, but sx feel a bit different  - Takes Zantac prn, which does seem to help  - Notes chronic phlegm in throat that never seems to go away  - Excess belching  - Occasionally gets constipated, which makes symptoms worse.  - No constipation currently, sx not resolved  - No BENJAMIN, palpitations, SOB, wheeze.  - Skin-toned bump on LUE, appeared 2 weeks ago    Patient Active Problem List   Diagnosis     Alopecia     Chronic maxillary sinusitis     Urgency incontinence     Dermatitis     Oral contraceptive use     Vaginitis and vulvovaginitis hx of recurrence      Past Surgical History:   Procedure Laterality Date     ABDOMEN SURGERY  2 Ovarian cysts removed and one tube and ovary removed         CYSTOSCOPY, BIOPSY BLADDER, COMBINED  10/11/2012    Procedure: COMBINED CYSTOSCOPY, BIOPSY BLADDER;  CYSTOSCOPY, BLADDER BIOPSY, FULGURATION;  Surgeon: Herberth Scott MD;  Location: [facility] OR     FOOT SURGERY  surgery on toe     GALLBLADDER SURGERY       GYN SURGERY      ovarian cyst     HC TOOTH EXTRACTION W/FORCEP         Social History     Tobacco Use     Smoking status: Former Smoker     Last attempt to quit: 10/11/1997     Years since quittin.8     Smokeless tobacco: Never Used   Substance Use Topics     Alcohol use: Yes     Alcohol/week:  0.0 oz     Comment: couple drinks/week     Family History   Problem Relation Age of Onset     Cancer Mother 71        Lyphoma     Hypertension Mother      Diabetes Father      Hypertension Father      Neurologic Disorder Maternal Grandmother         cerebral bleed         Current Outpatient Medications   Medication Sig Dispense Refill     Calcium Citrate-Vitamin D (CALCIUM CITRATE + D3) 250-200 MG-UNIT TABS Take 1 tablet by mouth daily       cholecalciferol (VITAMIN D) 400 UNIT TABS Take 400 Units by mouth daily       estradiol (VAGIFEM) 10 MCG TABS vaginal tablet Place 10 mcg vaginally       fluticasone (FLONASE) 50 MCG/ACT nasal spray INHALE 2 SPRAYS IN EACH NOSTRIL EVERY DAY 16 g 1     Lactobacillus (PROBIOTIC ACIDOPHILUS PO) Take 1 capsule by mouth daily       oxybutynin ER (DITROPAN XL) 15 MG 24 hr tablet Take 1 tablet (15 mg) by mouth daily 90 tablet 0     pantoprazole (PROTONIX) 20 MG EC tablet Take 1 tablet (20 mg) by mouth daily 90 tablet 0         Reviewed and updated as needed this visit by Provider  Tobacco  Allergies  Meds  Problems  Med Hx  Surg Hx  Fam Hx         Review of Systems   ROS COMP: Constitutional, HEENT, cardiovascular, pulmonary, GI, , musculoskeletal, neuro, skin, endocrine and psych systems are negative, except as otherwise noted.      Objective    /76 (BP Location: Right arm, Patient Position: Sitting, Cuff Size: Adult Regular)   Pulse 84   Temp 98.2  F (36.8  C) (Tympanic)   Resp 16   Wt 61.9 kg (136 lb 8 oz)   LMP 12/07/2016   SpO2 97%   BMI 24.97 kg/m    Body mass index is 24.97 kg/m .  Physical Exam   GENERAL:  WDWN, no acute distress  PSYCH: pleasant, cooperative  EYES: no discharge, no injection  HENT:  Normocephalic. Moist mucus membranes. Ear canals clear, TMs pearly gray w/o effusion. Oropharynx pink, uvula midline.  NECK:  Supple, symmetric  LUNGS:  Clear to auscultation bilaterally without rhonchi, rales, or wheeze. Chest rise symmetric and no tenderness  to palpation.  HEART:  Regular rate & rhythm. No murmur, gallop, or rub.  ABDOMEN:  Soft, non-tender, non-distended. Bowel sounds are present. No palpable masses  EXTREMITIES:  No gross deformities, moves all 4 limbs spontaneously, no peripheral edema  SKIN:  Warm and dry. Skin-toned papule on Lt forearm.   NEUROLOGIC: alert, sensation grossly intact.    Diagnostic Test Results:  CXR - negative by my read, awaiting radiology report    EKG - appears normal, NSR, normal axis, normal intervals, no acute ST/T changes c/w ischemia, no LVH by voltage criteria, there are no prior tracings available        Assessment & Plan       ICD-10-CM    1. Chest pressure R07.89 EKG 12-lead complete w/read - Clinics     XR Chest 2 Views   2. Benign skin cyst L72.9    3. Gastroesophageal reflux disease without esophagitis K21.9 H Pylori antigen, stool     pantoprazole (PROTONIX) 20 MG EC tablet   4. Hiatal hernia K44.9 pantoprazole (PROTONIX) 20 MG EC tablet     - Suspect sx GERD related. No obvious hiatal hernia on exam, but may be sliding.  - Start daily Protonix x6 weeks. Ok to use Zantac, Maalox/Tums prn  - Reviewed dietary recommendations  - Check stool for H pylori.    - Skin lesion consistent with benign cyst. Discussed worrisome sx that warrant recheck.      Return in about 6 weeks (around 9/7/2019), or if symptoms worsen or fail to improve.    Ashely Snow PA-C  [facility]

## 2019-07-30 DIAGNOSIS — K21.9 GASTROESOPHAGEAL REFLUX DISEASE WITHOUT ESOPHAGITIS: ICD-10-CM

## 2019-07-30 PROCEDURE — 87338 HPYLORI STOOL AG IA: CPT | Performed by: PHYSICIAN ASSISTANT

## 2019-08-01 LAB
H PYLORI AG STL QL IA: NORMAL
SPECIMEN SOURCE: NORMAL

## 2019-08-02 NOTE — RESULT ENCOUNTER NOTE
Grzegorz Avila,    I just wanted to let you know that your lab results have been reviewed and are attached.    - H pylori (bacteria that can cause acid reflux) was negative (no infection).    Please let me know if you have any questions.    Sincerely,    Ashely Snow PA-C    [place] [id number] [address] [id number] [phone] (p)

## 2019-08-14 DIAGNOSIS — N39.41 URGENCY INCONTINENCE: Chronic | ICD-10-CM

## 2019-08-15 RX ORDER — OXYBUTYNIN CHLORIDE 15 MG/1
TABLET, EXTENDED RELEASE ORAL
Qty: 90 TABLET | Refills: 0 | Status: SHIPPED | OUTPATIENT
Start: 2019-08-15 | End: 2019-11-11

## 2019-08-15 NOTE — TELEPHONE ENCOUNTER
"Last OV 07/27/2019.    Requested Prescriptions   Pending Prescriptions Disp Refills     oxybutynin ER (DITROPAN XL) 15 MG 24 hr tablet [Pharmacy Med Name: Oxybutynin Chloride ER Oral Tablet Extended Release 24 Hour 15 MG] 90 tablet 0     Sig: Take 1 tablet (15 mg) by mouth daily       Muscarinic Antagonists (Urinary Incontinence Agents) Passed - 8/14/2019  6:07 PM        Passed - Recent (12 mo) or future (30 days) visit within the authorizing provider's specialty     Patient had office visit in the last 12 months or has a visit in the next 30 days with authorizing provider or within the authorizing provider's specialty.  See \"Patient Info\" tab in inbasket, or \"Choose Columns\" in Meds & Orders section of the refill encounter.              Passed - Medication is Oxybutynin and patient is 5 years of age or older        Passed - Patient does not have a diagnosis of glaucoma on the problem list     If glaucoma diagnosis is new, refer refill to physician.          Passed - Medication is active on med list        Passed - Patient is 18 years of age or older        Writer unclear of duration of tx.   "

## 2019-09-06 ENCOUNTER — NURSE TRIAGE (OUTPATIENT)
Dept: FAMILY MEDICINE | Facility: CLINIC | Age: 54
End: 2019-09-06

## 2019-09-06 NOTE — TELEPHONE ENCOUNTER
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 Shared recommendations from CDC for infection prevention. Patient verbalizes she will cover her rash. No further action needed. Patient is scheduled for office visit tomorrow.     Additional Information    Negative: Difficult to awaken or acting confused (e.g., disoriented, slurred speech)    Negative: Sounds like a life-threatening emergency to the triager    Answer Assessment - Initial Assessment Questions  1. APPEARANCE of RASH: \"Describe the rash.\"       Similar to shingles she had before - red bumps, raised, right below ear on side of neck (between collar bone and neck).   2. LOCATION: \"Where is the rash located?\"       See above  3. ONSET: \"When did the rash start?\"       Noticed it yesterday  4. ITCHING: \"Does the rash itch?\" If so, ask: \"How bad is the itch?\"  (Scale 1-10; or mild, moderate, severe)      Not right now  5. PAIN: \"Does the rash hurt?\" If so, ask: \"How bad is the pain?\"  (Scale 1-10; or mild, moderate, severe)      No, tender  6. OTHER SYMPTOMS: \"Do you have any other symptoms?\" (e.g., fever)      No, has been chilled but also going through menopause. Throat was feeling weird a few weeks ago. Now coughing for no reason. Feels like there is goo in her throat.    Protocols used: SHINGLES-NEIDA      "

## 2019-09-30 ENCOUNTER — HEALTH MAINTENANCE LETTER (OUTPATIENT)
Age: 54
End: 2019-09-30

## 2019-10-14 ENCOUNTER — OFFICE VISIT (OUTPATIENT)
Dept: FAMILY MEDICINE | Facility: CLINIC | Age: 54
End: 2019-10-14
Payer: COMMERCIAL

## 2019-10-14 VITALS
BODY MASS INDEX: 24.84 KG/M2 | SYSTOLIC BLOOD PRESSURE: 102 MMHG | HEIGHT: 62 IN | OXYGEN SATURATION: 99 % | DIASTOLIC BLOOD PRESSURE: 66 MMHG | TEMPERATURE: 98.5 F | WEIGHT: 135 LBS | RESPIRATION RATE: 16 BRPM | HEART RATE: 80 BPM

## 2019-10-14 DIAGNOSIS — N39.41 URGENCY INCONTINENCE: Chronic | ICD-10-CM

## 2019-10-14 DIAGNOSIS — Z23 NEEDS FLU SHOT: ICD-10-CM

## 2019-10-14 DIAGNOSIS — Z00.00 ENCOUNTER FOR WELL ADULT EXAM WITHOUT ABNORMAL FINDINGS: Primary | ICD-10-CM

## 2019-10-14 DIAGNOSIS — Z13.220 LIPID SCREENING: ICD-10-CM

## 2019-10-14 LAB
BASOPHILS # BLD AUTO: 0 10E9/L (ref 0–0.2)
BASOPHILS NFR BLD AUTO: 0.5 %
DIFFERENTIAL METHOD BLD: NORMAL
EOSINOPHIL # BLD AUTO: 0.2 10E9/L (ref 0–0.7)
EOSINOPHIL NFR BLD AUTO: 3.6 %
ERYTHROCYTE [DISTWIDTH] IN BLOOD BY AUTOMATED COUNT: 11.9 % (ref 10–15)
HCT VFR BLD AUTO: 41.6 % (ref 35–47)
HGB BLD-MCNC: 13.7 G/DL (ref 11.7–15.7)
LYMPHOCYTES # BLD AUTO: 2.5 10E9/L (ref 0.8–5.3)
LYMPHOCYTES NFR BLD AUTO: 37.7 %
MCH RBC QN AUTO: 30.1 PG (ref 26.5–33)
MCHC RBC AUTO-ENTMCNC: 32.9 G/DL (ref 31.5–36.5)
MCV RBC AUTO: 91 FL (ref 78–100)
MONOCYTES # BLD AUTO: 0.5 10E9/L (ref 0–1.3)
MONOCYTES NFR BLD AUTO: 8.1 %
NEUTROPHILS # BLD AUTO: 3.3 10E9/L (ref 1.6–8.3)
NEUTROPHILS NFR BLD AUTO: 50.1 %
PLATELET # BLD AUTO: 236 10E9/L (ref 150–450)
RBC # BLD AUTO: 4.55 10E12/L (ref 3.8–5.2)
WBC # BLD AUTO: 6.7 10E9/L (ref 4–11)

## 2019-10-14 PROCEDURE — 90682 RIV4 VACC RECOMBINANT DNA IM: CPT | Performed by: FAMILY MEDICINE

## 2019-10-14 PROCEDURE — 90471 IMMUNIZATION ADMIN: CPT | Performed by: FAMILY MEDICINE

## 2019-10-14 PROCEDURE — 99396 PREV VISIT EST AGE 40-64: CPT | Mod: 25 | Performed by: FAMILY MEDICINE

## 2019-10-14 ASSESSMENT — MIFFLIN-ST. JEOR: SCORE: 1174.58
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
## 2019-10-15 ENCOUNTER — TELEPHONE (OUTPATIENT)
Dept: FAMILY MEDICINE | Facility: CLINIC | Age: 54
End: 2019-10-15

## 2019-10-15 DIAGNOSIS — Z13.220 LIPID SCREENING: Primary | ICD-10-CM

## 2019-10-15 LAB
ALBUMIN SERPL-MCNC: 4 G/DL (ref 3.4–5)
ALP SERPL-CCNC: 98 U/L (ref 40–150)
ALT SERPL W P-5'-P-CCNC: 29 U/L (ref 0–50)
ANION GAP SERPL CALCULATED.3IONS-SCNC: 5 MMOL/L (ref 3–14)
AST SERPL W P-5'-P-CCNC: 26 U/L (ref 0–45)
BILIRUB SERPL-MCNC: 0.2 MG/DL (ref 0.2–1.3)
BUN SERPL-MCNC: 17 MG/DL (ref 7–30)
CALCIUM SERPL-MCNC: 8.8 MG/DL (ref 8.5–10.1)
CHLORIDE SERPL-SCNC: 106 MMOL/L (ref 94–109)
CHOLEST SERPL-MCNC: 230 MG/DL
CO2 SERPL-SCNC: 27 MMOL/L (ref 20–32)
CREAT SERPL-MCNC: 0.54 MG/DL (ref 0.52–1.04)
GFR SERPL CREATININE-BSD FRML MDRD: >90 ML/MIN/{1.73_M2}
GLUCOSE SERPL-MCNC: 90 MG/DL (ref 70–99)
HDLC SERPL-MCNC: 63 MG/DL
LDLC SERPL CALC-MCNC: 116 MG/DL
NONHDLC SERPL-MCNC: 167 MG/DL
POTASSIUM SERPL-SCNC: 3.5 MMOL/L (ref 3.4–5.3)
PROT SERPL-MCNC: 7.3 G/DL (ref 6.8–8.8)
SODIUM SERPL-SCNC: 138 MMOL/L (ref 133–144)
TRIGL SERPL-MCNC: 257 MG/DL

## 2019-10-15 NOTE — TELEPHONE ENCOUNTER
Reason for Call: Request for an order or referral:    Order or referral being requested:Lab orders to check cholesterol     Date needed: as soon as possible    Has the patient been seen by the PCP for this problem? YES    Additional comments: The patient saw Dr. Eason on 10/14 and she told her that she needed to get her cholesterol levels checked. She did not put in lab orders for her to do so, so she would like her to put the orders in so she can schedule a lab only appointment. She would like a call back once the orders have been put in so she knows she can schedule a lab appointment.     Phone number Patient can be reached at:  Cell number on file:    Telephone Information:   Mobile [phone]       Best Time:  Any    Can we leave a detailed message on this number?  YES    Call taken on 10/15/2019 at 3:03 PM by Denia Schroeder

## 2019-10-15 NOTE — TELEPHONE ENCOUNTER
Per chart review, lipid panel result is in process. Huddled with mariya Jasso and verified lab was drawn 10/14/2019. Pt was not fasting at appointment. Pt was informed we are waiting for result. Pt was not aware these were drawn but agreed to wait for results. Triage, please check for results 10/16/2019. If lipid results elevated, please check with provider if pt should have fasting lab orders. New order would need to be placed.

## 2019-10-16 LAB — HIV 1+2 AB+HIV1 P24 AG SERPL QL IA: NONREACTIVE

## 2019-10-16 NOTE — TELEPHONE ENCOUNTER
Please review lipid panel and advice if pt should recheck lab since she was not fasting 10/14/2019.

## 2019-10-16 NOTE — TELEPHONE ENCOUNTER
Writer called patient financial services and asked if lipid profile charges could be removed since they were drawn on error. I was informed message will be sent to the coding team for review. They will send a letter to patient with response. This may take up to two weeks.     Would provider consider new lab order for lipid panel or would order cholesterol only? Both lab orders were pended. Please advice.

## 2019-10-16 NOTE — TELEPHONE ENCOUNTER
Patient called back, messages reviewed with patient below. She said she was told to go to the lab by the MA after she had her flu shot. She does not want to pay for a test twice that should have been a part of her preventative exam. It was suggested to the patient to wait until she heard from financial services to know that she gets refunded first before having it drawn again. Which lab test would pt absolutely need to have re tested?

## 2019-10-16 NOTE — TELEPHONE ENCOUNTER
Very interesting...so might have been confusion coming from the MA's directions?  But I still think lab should have asked her if she was fasting--might have been able to catch that error.  Labs should not be done if it is specifically ordered as fasting, correct?    I spoke with [place].  I think the fair thing would be to not charge for the cholesterol panel x2 since we will need to repeat it.  Her glucose was normal.    So, only one charge for the lipids would be enough.  Does this sound reasonable?  --Dr. Eason

## 2019-10-16 NOTE — TELEPHONE ENCOUNTER
So...    I had originally ordered all labs as FUTURE because she was not fasting that day.  However, the lab released all of them (and I am still not sure why or how that happened?!?), so all labs were tested that day per my understanding.    I heard that the pt wanted to only get the HIV screen tested that day but maybe lab thought she wanted to do all of them?    Again, I am not sure why she wanted to get any labs that day because we agreed (and discussed this a lot during the appt) that she would come back later to get ALL those labs tested another time when she would be fasting so we did not have to draw labs twice in case her trigs or glucose were high...  So not really sure what happened, but that's what I heard.    My MA (Thais) was suppose to call her to find out if she wanted the fasting labs cancelled because we might need to re-draw lipids/glucose since she was not fasting.... not sure if Thais got a hold of her.

## 2019-10-17 NOTE — RESULT ENCOUNTER NOTE
Grzegorz Avila,    I just wanted to let you know that your lab results have been reviewed and are attached.    - Ana Eason DO is currently out of the office.  - Your cholesterol is high but the American Heart Association does not recommend starting a medication to lower this at this time.  We will recheck next year.  - Your metabolic panel shows:  normal electrolytes (sodium, potassium, calcium), kidney function (creatinine and GFR),  blood sugar and liver function (AST/ALT).  - Your Blood Count Results show normal White Blood Cell count (no sign of infection), normal Hemoglobin (not anemia), and normal Platelets (affects clotting).  - Your HIV 1 and 2 Antibody test is negative for infection.    Please let me know if you have any questions and have a great week!    Sincerely,    Karyn Giron PA-C    [facility]  [address]  [place] [address]  [phone] (p)

## 2019-10-20 NOTE — TELEPHONE ENCOUNTER
Just one more thing....  I was hoping we could get in touch with the pt and inform her of the following:    Pt's triglycerides came back fairly high.  However, we can assume her triglycerides were high due to not fasting at the time when the lab was done    Karyn reviewed her labs when I was out of the office.  I agree with her review for the most part but think her Triglycerides deserve some more attention.     .  Instead of waiting 1 year to get her lipids rechecked at her next yearly physical (as Karyn recommended), I would have her consider getting her Lipids re-checked (free of charge) sooner in order to make sure her Triglycerides are actually normal.  Should be free of charge since the lab released the order when she was not fasting, even though it was ordered to be done only when she is fasting.    Thanks!  --Dr. Eason

## 2019-10-21 NOTE — TELEPHONE ENCOUNTER
Huddled with [facility], [place], to determine next steps on ordering labs free of charge. Waiting for lab leadership direction before calling patient.

## 2019-10-21 NOTE — TELEPHONE ENCOUNTER
Meldled with MIKAL Garcia who states that her first set of labs were credited - no charge to her. Dr. Eason would like her to return to clinic for repeat of lipids. Should not be charged twice (as first set of labs were credited to her).     Patient Contact    Attempt # 1    Was call answered?  No.  Left message on voicemail with information to call triage back.    Upon callback, relay above message.

## 2019-10-26 DIAGNOSIS — Z13.220 LIPID SCREENING: ICD-10-CM

## 2019-10-26 LAB
CHOLEST SERPL-MCNC: 262 MG/DL
HDLC SERPL-MCNC: 78 MG/DL
LDLC SERPL CALC-MCNC: 169 MG/DL
NONHDLC SERPL-MCNC: 184 MG/DL
TRIGL SERPL-MCNC: 76 MG/DL

## 2019-10-26 PROCEDURE — 80061 LIPID PANEL: CPT | Performed by: FAMILY MEDICINE

## 2019-10-26 PROCEDURE — 36415 COLL VENOUS BLD VENIPUNCTURE: CPT | Performed by: FAMILY MEDICINE

## 2019-11-11 DIAGNOSIS — N39.41 URGENCY INCONTINENCE: Chronic | ICD-10-CM

## 2019-11-12 RX ORDER — OXYBUTYNIN CHLORIDE 15 MG/1
TABLET, EXTENDED RELEASE ORAL
Qty: 90 TABLET | Refills: 0 | Status: SHIPPED | OUTPATIENT
Start: 2019-11-12 | End: 2020-02-17

## 2019-11-12 NOTE — TELEPHONE ENCOUNTER
"Requested Prescriptions   Pending Prescriptions Disp Refills     oxybutynin ER (DITROPAN XL) 15 MG 24 hr tablet [Pharmacy Med Name: Oxybutynin Chloride ER Oral Tablet Extended Release 24 Hour 15 MG]  Last Written Prescription Date:  8/15/2019  Last Fill Quantity: 90 tablet,  # refills: 0   Last office visit: 10/14/2019 with prescribing provider:  Yumi   Future Office Visit:     90 tablet 0     Sig: Take 1 tablet (15 mg) by mouth daily       Muscarinic Antagonists (Urinary Incontinence Agents) Passed - 11/11/2019  8:50 PM        Passed - Recent (12 mo) or future (30 days) visit within the authorizing provider's specialty     Patient has had an office visit with the authorizing provider or a provider within the authorizing providers department within the previous 12 mos or has a future within next 30 days. See \"Patient Info\" tab in inbasket, or \"Choose Columns\" in Meds & Orders section of the refill encounter.              Passed - Medication is Oxybutynin and patient is 5 years of age or older        Passed - Patient does not have a diagnosis of glaucoma on the problem list     If glaucoma diagnosis is new, refer refill to physician.          Passed - Medication is active on med list        Passed - Patient is 18 years of age or older           "

## 2019-11-24 NOTE — LETTER
April 13, 2018      Margarita CLEMENTS Kai  [address]  [facility] [address]        Dear ,    We are writing to inform you of your test results.    All of your tests were NORMAL.  Your urine test was negative for a bladder infection and your vaginal discharge test was negative for an infection as well.     Resulted Orders   UA reflex to Microscopic   Result Value Ref Range    Color Urine Yellow     Appearance Urine Clear     Glucose Urine Negative NEG^Negative mg/dL    Bilirubin Urine Negative NEG^Negative    Ketones Urine Negative NEG^Negative mg/dL    Specific Gravity Urine >1.030 1.003 - 1.035    Blood Urine Negative NEG^Negative    pH Urine 5.0 5.0 - 7.0 pH    Protein Albumin Urine Negative NEG^Negative mg/dL    Urobilinogen Urine 0.2 0.2 - 1.0 EU/dL    Nitrite Urine Negative NEG^Negative    Leukocyte Esterase Urine Negative NEG^Negative    Source Midstream Urine    Wet prep   Result Value Ref Range    Specimen Description Vagina     Wet Prep No Trichomonas seen     Wet Prep No yeast seen     Wet Prep No clue cells seen        If you have any questions or concerns, please call the clinic at the number listed above.       Sincerely,        Ana Eason, DO                 CKD (chronic kidney disease), stage III

## 2020-01-20 ENCOUNTER — OFFICE VISIT (OUTPATIENT)
Dept: FAMILY MEDICINE | Facility: CLINIC | Age: 55
End: 2020-01-20
Payer: COMMERCIAL

## 2020-01-20 VITALS
BODY MASS INDEX: 25.13 KG/M2 | HEART RATE: 68 BPM | DIASTOLIC BLOOD PRESSURE: 64 MMHG | TEMPERATURE: 97.4 F | OXYGEN SATURATION: 98 % | WEIGHT: 138.5 LBS | SYSTOLIC BLOOD PRESSURE: 110 MMHG

## 2020-01-20 DIAGNOSIS — J01.01 ACUTE RECURRENT MAXILLARY SINUSITIS: Primary | ICD-10-CM

## 2020-01-20 PROCEDURE — 99213 OFFICE O/P EST LOW 20 MIN: CPT | Performed by: FAMILY MEDICINE

## 2020-02-10 ENCOUNTER — TRANSFERRED RECORDS (OUTPATIENT)
Dept: HEALTH INFORMATION MANAGEMENT | Facility: CLINIC | Age: 55
End: 2020-02-10

## 2020-02-15 DIAGNOSIS — N39.41 URGENCY INCONTINENCE: Chronic | ICD-10-CM

## 2020-02-17 RX ORDER — OXYBUTYNIN CHLORIDE 15 MG/1
TABLET, EXTENDED RELEASE ORAL
Qty: 90 TABLET | Refills: 3 | Status: SHIPPED | OUTPATIENT
Start: 2020-02-17

## 2020-02-17 NOTE — TELEPHONE ENCOUNTER
"Requested Prescriptions   Pending Prescriptions Disp Refills     oxybutynin ER (DITROPAN XL) 15 MG 24 hr tablet [Pharmacy Med Name:  Last Written Prescription Date:  11/12/2019  Last Fill Quantity: 90,  # refills: 0   Last office visit: 1/20/2020 with prescribing provider:  Dr Benitez   Future Office Visit:     Oxybutynin Chloride ER Oral Tablet Extended Release 24 Hour 15 MG] 90 tablet 0     Sig: TAKE 1 TABLET BY MOUTH DAILY       Muscarinic Antagonists (Urinary Incontinence Agents) Passed - 2/15/2020 11:03 PM        Passed - Recent (12 mo) or future (30 days) visit within the authorizing provider's specialty     Patient has had an office visit with the authorizing provider or a provider within the authorizing providers department within the previous 12 mos or has a future within next 30 days. See \"Patient Info\" tab in inbasket, or \"Choose Columns\" in Meds & Orders section of the refill encounter.              Passed - Medication is Oxybutynin and patient is 5 years of age or older        Passed - Patient does not have a diagnosis of glaucoma on the problem list     If glaucoma diagnosis is new, refer refill to physician.          Passed - Medication is active on med list        Passed - Patient is 18 years of age or older          "

## 2020-05-22 ENCOUNTER — VIRTUAL VISIT (OUTPATIENT)
Dept: FAMILY MEDICINE | Facility: CLINIC | Age: 55
End: 2020-05-22
Payer: COMMERCIAL

## 2020-05-22 DIAGNOSIS — K11.20 SIALADENITIS: Primary | ICD-10-CM

## 2020-05-22 DIAGNOSIS — K21.9 GASTROESOPHAGEAL REFLUX DISEASE WITHOUT ESOPHAGITIS: ICD-10-CM

## 2020-05-22 DIAGNOSIS — K44.9 HIATAL HERNIA: ICD-10-CM

## 2020-05-22 PROCEDURE — 99214 OFFICE O/P EST MOD 30 MIN: CPT | Mod: GT | Performed by: PHYSICIAN ASSISTANT

## 2020-05-22 RX ORDER — PANTOPRAZOLE SODIUM 20 MG/1
20 TABLET, DELAYED RELEASE ORAL DAILY
Qty: 90 TABLET | Refills: 3 | Status: SHIPPED | OUTPATIENT
Start: 2020-05-22 | End: 2021-05-25

## 2020-05-22 NOTE — PATIENT INSTRUCTIONS
"  Patient Education     Salivary Gland Infection  Salivary glands make saliva. Saliva is mostly water. It also has minerals and proteins that help break down food and keep the mouth and teeth healthy. There are 3 pairs of salivary glands:    Parotid glands (in front of the ear)    Submandibular glands (below the jaw)    Sublingual glands (below the tongue)  A salivary gland can become infected by bacteria (germs). Things that make this more likely include dehydration and taking medicines that affect saliva flow. Infection is also more likely when the tube (duct) that carries saliva from the gland to the mouth is blocked. It may be blocked by a salivary gland \"stone.\" This is a collection of minerals that forms in the salivary gland.  Signs of infection include fever, severe pain in the gland, and redness and swelling over the gland. It may hurt to open the mouth. Symptoms may be worse when the flow of saliva is stimulated, such as by the smell of food.   Antibiotics are used to treat the infection. Drainage of the infection with a simple surgery may be needed. If you have a salivary gland stone, a procedure may be done to remove it.  Home Care:    Take antibiotics as directed until they are finished. Do this even if you start to feel better after only a few days.    Unless another medicine was prescribed, take over-the-counter medicines, such as acetaminophen or ibuprofen, to help relieve pain.    Moist heat can also help relieve swelling and pain. Wet a cloth with warm water and put it over the sore gland for 10-15 minutes several times a day.    Gently massage the gland a few times a day.     Suck on lemon or other tart hard candies to cause flow of saliva.  To help prevent stones and infections:    Drink 6-8 glasses of fluid per day (such as water, tea, and clear soup) to keep well hydrated.    If you smoke, ask your healthcare provider for help to quit. Smoking makes salivary gland stones more likely.    Keep " good dental hygiene. Brush and floss your teeth daily. See your dentist for regular cleanings.  Follow-up care  Follow up with your healthcare provider or as advised.   When to seek medical advice  Call your healthcare provider if any of the following occur:    Fever over 100.4 F (38 C) after 2 days of taking antibiotics    Symptoms that get worse or don't get better in a few days    Trouble breathing or swallowing  Date Last Reviewed: 10/1/2017    [id number] [employer] [address] [place] [address]. All rights reserved. This information is not intended as a substitute for professional medical care. Always follow your healthcare professional's instructions.

## 2020-05-22 NOTE — PROGRESS NOTES
"Margarita Quinn is a 54 year old female who is being evaluated via a billable video visit.      The patient has been notified of following:     \"This video visit will be conducted via a call between you and your physician/provider. We have found that certain health care needs can be provided without the need for an in-person physical exam.  This service lets us provide the care you need with a video conversation.  If a prescription is necessary we can send it directly to your pharmacy.  If lab work is needed we can place an order for that and you can then stop by our lab to have the test done at a later time.    Video visits are billed at different rates depending on your insurance coverage.  Please reach out to your insurance provider with any questions.    If during the course of the call the physician/provider feels a video visit is not appropriate, you will not be charged for this service.\"    Patient has given verbal consent for Video visit? Yes    How would you like to obtain your AVS? Mail a copy    Patient would like the video invitation sent by: Text to cell phone: [phone]    Will anyone else be joining your video visit? No    Subjective     Margarita Quinn is a 54 year old female who presents today via video visit for the following health issues:    HPI  tongue      Duration: 2 days    Description (location/character/radiation): under tongue    Intensity:  moderate    Accompanying signs and symptoms: swelling    History (similar episodes/previous evaluation): None    Precipitating or alleviating factors: None    Therapies tried and outcome: None     Pt was seen previously by maria a for air in chest it is now back and would like a refill on medication last suggestions Start daily Protonix x6 weeks. Ok to use Zantac, Maalox/Tums prn      Video Start Time: 11:09 AM        Recent Labs   Lab Test 10/26/19  0847 10/14/19  1602 11/02/18  1621  01/09/15  1425 10/05/13  0932   * 116*  --   --   --  111 " "  HDL 78 63  --   --   --  69   TRIG 76 257*  --   --   --  74   ALT  --  29 32  --   --   --    CR  --  0.54 0.58   < > 0.70  --    GFRESTIMATED  --  >90 >90   < > 89  --    GFRESTBLACK  --  >90 >90   < > >90  --    POTASSIUM  --  3.5 4.0   < > 3.8  --    TSH  --   --  1.92  --  1.32  --     < > = values in this interval not displayed.      BP Readings from Last 3 Encounters:   01/20/20 110/64   10/14/19 102/66   07/27/19 102/76    Wt Readings from Last 3 Encounters:   01/20/20 62.8 kg (138 lb 8 oz)   10/14/19 61.2 kg (135 lb)   07/27/19 61.9 kg (136 lb 8 oz)                    Reviewed and updated as needed this visit by Provider  Tobacco  Allergies  Meds  Problems  Med Hx  Surg Hx  Fam Hx         Review of Systems   Constitutional, HEENT, cardiovascular, pulmonary, GI, , musculoskeletal, neuro, skin, endocrine and psych systems are negative, except as otherwise noted.      Objective    LMP 12/07/2016   Estimated body mass index is 25.13 kg/m  as calculated from the following:    Height as of 10/14/19: 1.581 m (5' 2.25\").    Weight as of 1/20/20: 62.8 kg (138 lb 8 oz).  Physical Exam     GENERAL: Healthy, alert and no distress  EYES: Eyes grossly normal to inspection.  No discharge or erythema, or obvious scleral/conjunctival abnormalities.  RESP: No audible wheeze, cough, or visible cyanosis.  No visible retractions or increased work of breathing.    SKIN: Visible skin clear. No significant rash, abnormal pigmentation or lesions.  NEURO: Cranial nerves grossly intact.  Mentation and speech appropriate for age.  PSYCH: Mentation appears normal, affect normal/bright, judgement and insight intact, normal speech and appearance well-groomed.      Diagnostic Test Results:  Labs reviewed in Epic        Assessment & Plan       ICD-10-CM    1. Sialadenitis  K11.20 amoxicillin-clavulanate (AUGMENTIN) 875-125 MG tablet   2. Gastroesophageal reflux disease without esophagitis  K21.9 pantoprazole (PROTONIX) 20 MG EC " tablet   3. Hiatal hernia  K44.9 pantoprazole (PROTONIX) 20 MG EC tablet      Likely salivary gland infection, will start sucking on lemon drops and start antibiotic.  If not decreasing in size by Tuesday, may need to be seen in person for evaluation.     Restart Protonix for chest pressure.    Video-Visit Details    Type of service:  Video Visit    Video End Time:11:17 AM    Originating Location (pt. Location): Other Work    Distant Location (provider location):  [facility]     Platform used for Video Visit: Martine    Return in about 1 week (around 5/29/2020) for Recheck if not improving.       Karen Giron PA-C

## 2020-08-21 ENCOUNTER — TRANSFERRED RECORDS (OUTPATIENT)
Dept: HEALTH INFORMATION MANAGEMENT | Facility: CLINIC | Age: 55
End: 2020-08-21

## 2020-10-06 ENCOUNTER — TELEPHONE (OUTPATIENT)
Dept: FAMILY MEDICINE | Facility: CLINIC | Age: 55
End: 2020-10-06

## 2020-10-06 DIAGNOSIS — Z12.11 SCREENING FOR COLON CANCER: Primary | ICD-10-CM

## 2020-10-06 NOTE — TELEPHONE ENCOUNTER
Reason for Call:  Other call back    Detailed comments: Margarita called saying she is due for her colon cancer screening kit. She requests to get a phone call when the order is in her chart and she can pick it up at the lab.    Phone Number Patient can be reached at: Cell number on file:    Telephone Information:   Mobile [phone]       Best Time: any    Can we leave a detailed message on this number? YES    Call taken on 10/6/2020 at 9:11 AM by Marge Natarajan

## 2020-10-06 NOTE — TELEPHONE ENCOUNTER
Ordered, she can make a lab only appointment to come  (I think she needs appointment, not sure though.)

## 2020-10-09 ENCOUNTER — ALLIED HEALTH/NURSE VISIT (OUTPATIENT)
Dept: NURSING | Facility: CLINIC | Age: 55
End: 2020-10-09
Payer: COMMERCIAL

## 2020-10-09 ENCOUNTER — APPOINTMENT (OUTPATIENT)
Dept: LAB | Facility: CLINIC | Age: 55
End: 2020-10-09
Payer: COMMERCIAL

## 2020-10-09 DIAGNOSIS — Z23 NEED FOR VACCINATION: Primary | ICD-10-CM

## 2020-10-09 DIAGNOSIS — Z23 NEED FOR PROPHYLACTIC VACCINATION AND INOCULATION AGAINST INFLUENZA: ICD-10-CM

## 2020-10-09 PROCEDURE — 90471 IMMUNIZATION ADMIN: CPT

## 2020-10-09 PROCEDURE — 90682 RIV4 VACC RECOMBINANT DNA IM: CPT

## 2020-10-09 PROCEDURE — 90715 TDAP VACCINE 7 YRS/> IM: CPT

## 2020-10-09 PROCEDURE — 99207 PR NO CHARGE LOS: CPT

## 2020-10-19 DIAGNOSIS — Z12.11 SCREENING FOR COLON CANCER: ICD-10-CM

## 2020-10-19 PROCEDURE — 82274 ASSAY TEST FOR BLOOD FECAL: CPT | Performed by: PHYSICIAN ASSISTANT

## 2020-10-22 LAB — HEMOCCULT STL QL IA: NEGATIVE

## 2020-10-22 NOTE — RESULT ENCOUNTER NOTE
Grzegorz Avila,    I just wanted to let you know that your lab results have been reviewed and are attached.    -FIT test (screening test for colon cancer) was normal.  Recheck due in 1 year.    Please let me know if you have any questions and have a great week!    Sincerely,    Karyn Giron PA-C    Family Medicine  [facility]  [address]  [place] [address]  [phone] (p)

## 2021-01-15 ENCOUNTER — HEALTH MAINTENANCE LETTER (OUTPATIENT)
Age: 56
End: 2021-01-15

## 2021-02-12 ENCOUNTER — OFFICE VISIT (OUTPATIENT)
Dept: URGENT CARE | Facility: URGENT CARE | Age: 56
End: 2021-02-12
Payer: COMMERCIAL

## 2021-02-12 VITALS
SYSTOLIC BLOOD PRESSURE: 110 MMHG | WEIGHT: 137 LBS | TEMPERATURE: 96.7 F | HEART RATE: 74 BPM | HEIGHT: 62 IN | BODY MASS INDEX: 25.21 KG/M2 | DIASTOLIC BLOOD PRESSURE: 64 MMHG | OXYGEN SATURATION: 98 %

## 2021-02-12 DIAGNOSIS — M54.32 SCIATICA OF LEFT SIDE: Primary | ICD-10-CM

## 2021-02-12 PROCEDURE — 99213 OFFICE O/P EST LOW 20 MIN: CPT | Performed by: PHYSICIAN ASSISTANT

## 2021-02-12 RX ORDER — CYCLOBENZAPRINE HCL 5 MG
5 TABLET ORAL 3 TIMES DAILY PRN
Qty: 16 TABLET | Refills: 0 | Status: SHIPPED | OUTPATIENT
Start: 2021-02-12

## 2021-02-12 RX ORDER — PREDNISONE 20 MG/1
20 TABLET ORAL 2 TIMES DAILY
Qty: 10 TABLET | Refills: 0 | Status: SHIPPED | OUTPATIENT
Start: 2021-02-12 | End: 2021-02-17

## 2021-02-12 ASSESSMENT — MIFFLIN-ST. JEOR: SCORE: 1178.65

## 2021-02-12 NOTE — PATIENT INSTRUCTIONS
Patient Education     Understanding Lumbar Radiculopathy    Lumbar radiculopathy is irritation or inflammation of a nerve root in the low back. It causes symptoms that spread out from the back down one or both legs. To understand this condition, it helps to understand the parts of the spine:    Vertebrae. These are bones that stack to form the spine. The lumbar spine contains 5 vertebrae near the bottom of your spine.    Disks. These are soft pads of tissue between the vertebrae. They act as shock absorbers for the spine.    Spinal canal. This is a tunnel formed within the stacked vertebrae. In the lumbar spine, nerves run through this canal.    Nerves. These branch off and leave the spinal canal, traveling out to parts of the body. As they leave the spinal canal, nerves pass through openings between the vertebrae. The nerve root is the part of the nerve that is closest to the spinal canal.    Sciatic nerve. This is a large nerve formed from several nerve roots in the low back. This nerve extends down the back of the leg to the foot.  With lumbar radiculopathy, nerve roots in the low back become irritated. This leads to pain and symptoms. The sciatic nerve is commonly involved, so the condition is often called sciatica.  What causes lumbar radiculopathy?  Aging, injury, poor posture, extra body weight, and other issues can lead to problems in the low back. These problems may then irritate nerve roots. They include:    Damage to a disk in the lumbar spine. The damaged disk may then press on nearby nerve roots.    Degeneration from wear and tear, and aging. This can lead to narrowing (stenosis) of the openings between the vertebrae. The narrowed openings press on nerve roots as they leave the spinal canal.    Unstable spine. This is when a vertebra slips forward. It can then press on a nerve root.  Other, less common things can put pressure on nerves in the low back. These include diabetes, infection, or a  tumor.  Symptoms of lumbar radiculopathy  These include:    Pain in the low back    Pain, numbness, tingling, or weakness that travels into the buttocks, hip, groin, or leg    Muscle spasms in the low back, or leg  Treatment for lumbar radiculopathy  In most cases, your healthcare provider will first try treatments that help relieve symptoms. These may include:    Prescription and over-the-counter pain medicines. These help relieve pain, swelling, and irritation.    Limits on positions and activities that increase pain. But lying in bed or avoiding all movement is only recommended for a short period of time.    Physical therapy, including exercises and stretches. This helps decrease pain and increase movement and function.    Steroid shots into the lower back. This may help relieve symptoms for a time.    Weight-loss program. If you are overweight, losing extra pounds (kilograms) may help relieve symptoms.  In some cases, you may need surgery to fix the underlying problem. This depends on the cause, the symptoms, and how long the pain has lasted.  Possible complications  Over time, an irritated and inflamed nerve may become damaged. This may lead to long-lasting (permanent) numbness or weakness in your legs and feet. If symptoms change suddenly or get worse, be sure to let your healthcare provider know.  When to call your healthcare provider  Call your healthcare provider right away if you have any of these:    New pain or pain that gets worse    New or increasing weakness, tingling, or numbness in your leg or foot    Problems controlling your bladder or bowel  Dary last reviewed this educational content on 2/1/2020 2000-2020 [employer]. [address] [place] [address], [place] [address]. All rights reserved. This information is not intended as a substitute for professional medical care. Always follow your healthcare professional's instructions.

## 2021-02-12 NOTE — PROGRESS NOTES
"HPI:  Margarita Quinn is a 55 year old female who presents for evaluation of intermittent pain & tingling in L hip and thigh onset 3 weeks ago. No known injury. Patient reports no history of back problems or surgeries, but does have a history of DDD in her cervical spine. No treatments tried. Symptoms are moderate in severity & constant in duration. Patient reports no fever/chills, headache, chest pain, shortness of breath, abdominal pain, nausea/vomiting, bowel/bladder incontinence, urinary symptoms, saddle anesthesia, numbness, weakness or any other symptoms.     Past Medical History:   Diagnosis Date     Alopecia      Gastro-oesophageal reflux disease      Overactive bladder      Sinusitis, chronic      Tobacco abuse: [age] y/o @2ppd=31 pk yr hx  9/13/2013       Vitals:    02/12/21 1203   BP: 110/64   Pulse: 74   Temp: 96.7  F (35.9  C)   TempSrc: Tympanic   SpO2: 98%   Weight: 62.1 kg (137 lb)   Height: 1.581 m (5' 2.25\")       Physical Exam  Vitals signs and nursing note reviewed.   Cardiovascular:      Pulses:           Dorsalis pedis pulses are 2+ on the right side and 2+ on the left side.   Pulmonary:      Effort: Pulmonary effort is normal.   Musculoskeletal:      Left hip: Normal. She exhibits no bony tenderness.      Lumbar back: Normal.      Comments: Dorsiflexion intact bilaterally. Positive SLR on left, negative SLR on right. Patient ambulatory.   Neurological:      Mental Status: She is oriented to person, place, and time.      Gait: Gait normal.      Comments: Strength 5/5 BLE         Labs/Imaging:  No results found for this or any previous visit (from the past 24 hour(s)).      Clinical Decision Making:  Differential diagnosis includes muscle spasm/strain, bulging/herniated disc w/ radicular pain including sciatica, cauda equina syndrome, vertebral fracture.      I do not believe a fracture to be the source of this patient's pain as there was no preceding trauma.      I do not believe the pain is " caused by an epidural abscess as the patient does not report hx of IV drug use and does not have fevers/chills or recent procedures.      Based on history and exam, the most likely etiology of this patient's back pain is sciatica due to DDD or herniated/bulging disc. Emergent MRI is not indicated as this patient does not have new weakness or cauda equina syndrome.  Patient has no bowel or bladder incontinence or saddle anesthesia; strength is 5/5 BLE. Will treat today with prednisone and Flexeril, patient to follow up with PCP if no improvement.  See patient instructions below.    At the end of the encounter, I discussed results, diagnosis, medications. Discussed red flags for immediate return to clinic/ER, as well as indications for follow up if no improvement. Patient understood and agreed to plan. Patient was stable for discharge.      ICD-10-CM    1. Sciatica of left side  M54.32 predniSONE (DELTASONE) 20 MG tablet     cyclobenzaprine (FLEXERIL) 5 MG tablet         If not improving or if condition worsens, follow up with your Primary Care Provider    BILL Kat, PABELIA  [facility]    Patient Instructions   Patient Education     Understanding Lumbar Radiculopathy    Lumbar radiculopathy is irritation or inflammation of a nerve root in the low back. It causes symptoms that spread out from the back down one or both legs. To understand this condition, it helps to understand the parts of the spine:    Vertebrae. These are bones that stack to form the spine. The lumbar spine contains 5 vertebrae near the bottom of your spine.    Disks. These are soft pads of tissue between the vertebrae. They act as shock absorbers for the spine.    Spinal canal. This is a tunnel formed within the stacked vertebrae. In the lumbar spine, nerves run through this canal.    Nerves. These branch off and leave the spinal canal, traveling out to parts of the body. As they leave the spinal canal, nerves pass  through openings between the vertebrae. The nerve root is the part of the nerve that is closest to the spinal canal.    Sciatic nerve. This is a large nerve formed from several nerve roots in the low back. This nerve extends down the back of the leg to the foot.  With lumbar radiculopathy, nerve roots in the low back become irritated. This leads to pain and symptoms. The sciatic nerve is commonly involved, so the condition is often called sciatica.  What causes lumbar radiculopathy?  Aging, injury, poor posture, extra body weight, and other issues can lead to problems in the low back. These problems may then irritate nerve roots. They include:    Damage to a disk in the lumbar spine. The damaged disk may then press on nearby nerve roots.    Degeneration from wear and tear, and aging. This can lead to narrowing (stenosis) of the openings between the vertebrae. The narrowed openings press on nerve roots as they leave the spinal canal.    Unstable spine. This is when a vertebra slips forward. It can then press on a nerve root.  Other, less common things can put pressure on nerves in the low back. These include diabetes, infection, or a tumor.  Symptoms of lumbar radiculopathy  These include:    Pain in the low back    Pain, numbness, tingling, or weakness that travels into the buttocks, hip, groin, or leg    Muscle spasms in the low back, or leg  Treatment for lumbar radiculopathy  In most cases, your healthcare provider will first try treatments that help relieve symptoms. These may include:    Prescription and over-the-counter pain medicines. These help relieve pain, swelling, and irritation.    Limits on positions and activities that increase pain. But lying in bed or avoiding all movement is only recommended for a short period of time.    Physical therapy, including exercises and stretches. This helps decrease pain and increase movement and function.    Steroid shots into the lower back. This may help relieve  symptoms for a time.    Weight-loss program. If you are overweight, losing extra pounds (kilograms) may help relieve symptoms.  In some cases, you may need surgery to fix the underlying problem. This depends on the cause, the symptoms, and how long the pain has lasted.  Possible complications  Over time, an irritated and inflamed nerve may become damaged. This may lead to long-lasting (permanent) numbness or weakness in your legs and feet. If symptoms change suddenly or get worse, be sure to let your healthcare provider know.  When to call your healthcare provider  Call your healthcare provider right away if you have any of these:    New pain or pain that gets worse    New or increasing weakness, tingling, or numbness in your leg or foot    Problems controlling your bladder or bowel  Dary last reviewed this educational content on 2/1/2020 2000-2020 [employer]. [address] [place] [address], [place] [address]. All rights reserved. This information is not intended as a substitute for professional medical care. Always follow your healthcare professional's instructions.

## 2021-05-24 DIAGNOSIS — K21.9 GASTROESOPHAGEAL REFLUX DISEASE WITHOUT ESOPHAGITIS: ICD-10-CM

## 2021-05-24 DIAGNOSIS — K44.9 HIATAL HERNIA: ICD-10-CM

## 2021-05-25 RX ORDER — PANTOPRAZOLE SODIUM 20 MG/1
20 TABLET, DELAYED RELEASE ORAL DAILY
Qty: 90 TABLET | Refills: 0 | Status: SHIPPED | OUTPATIENT
Start: 2021-05-25

## 2021-05-25 NOTE — TELEPHONE ENCOUNTER
Medication is being filled for 1 time refill only due to:  Patient needs to be seen because it has been more than one year since last visit.   Routing to MA/TC pool. The Pt is due for a visit with PCP. Please call and help them schedule.    RN will issue a 90 day supply. No further refills until the Pt is seen.     Sheldon SERNA RN

## 2021-06-07 NOTE — TELEPHONE ENCOUNTER
As of 6/7/2021, patient has read [employer] message and is aware of the need to schedule.     ~Katiuska BROWER,

## 2021-10-24 ENCOUNTER — HEALTH MAINTENANCE LETTER (OUTPATIENT)
Age: 56
End: 2021-10-24

## 2022-02-13 ENCOUNTER — HEALTH MAINTENANCE LETTER (OUTPATIENT)
Age: 57
End: 2022-02-13

## 2022-05-02 NOTE — PROGRESS NOTES
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        BP Readings from Last 3 Encounters:   01/20/20 110/64   10/14/19 102/66   07/27/19 102/76    Wt Readings from Last 3 Encounters:   01/20/20 62.8 kg (138 lb 8 oz)   10/14/19 61.2 kg (135 lb)   07/27/19 61.9 kg (136 lb 8 oz)                    Reviewed and updated as needed this visit by Provider         Review of Systems   ROS COMP: CONSTITUTIONAL: NEGATIVE for fever, chills, change in weight  ENT/MOUTH: POSITIVE for Hx sinus infections, nasal congestion, rhinorrhea-purulent and sinus pressure  RESP: NEGATIVE for significant cough or SOB  CV: NEGATIVE for chest pain, palpitations or peripheral edema      Objective    /64 (Cuff Size: Adult Regular)   Pulse 68   Temp 97.4  F (36.3  C) (Tympanic)   Wt 62.8 kg (138 lb 8 oz)   LMP 12/07/2016   SpO2 98%   BMI 25.13 kg/m    Body mass index is 25.13 kg/m .  Physical Exam   GENERAL: healthy, alert and no distress  HENT: normal cephalic/atraumatic, ear canals and TM's normal, nose and mouth without ulcers or lesions, oropharynx clear, oral mucous membranes moist and sinuses: maxillary tenderness on bilateral  NECK: no adenopathy, no asymmetry, masses, or scars and thyroid normal to palpation  RESP: lungs clear to auscultation - no rales, rhonchi or wheezes    Diagnostic Test Results:  Labs reviewed in Epic  none         Assessment & Plan     Margarita was seen today for uri.    Diagnoses and all orders for this visit:    Acute recurrent maxillary sinusitis  -      amoxicillin-clavulanate (AUGMENTIN) 875-125 MG tablet; Take 1 tablet by mouth 2 times daily           FUTURE APPOINTMENTS:       - Follow-up visit in 2 weeks if not improving  Patient Instructions   Symptomatic treatment.  Will use saline gargles, tylenol and/or advil. Suck on  lozenges as needed. Push fluids. Salt water nasal spray as needed.  Use expectorant such as Mucinex for congestion      Return in about 2 weeks (around 2/3/2020), or if symptoms worsen or fail to improve, for sinusitis.    Iron Benitez MD  [facility]     (0) No drift; limb holds 90 (or 45) degrees for full 10 secs

## 2022-07-25 ENCOUNTER — OFFICE VISIT (OUTPATIENT)
Dept: URGENT CARE | Facility: URGENT CARE | Age: 57
End: 2022-07-25
Payer: COMMERCIAL

## 2022-07-25 VITALS
HEART RATE: 88 BPM | TEMPERATURE: 99.2 F | RESPIRATION RATE: 20 BRPM | OXYGEN SATURATION: 97 % | SYSTOLIC BLOOD PRESSURE: 133 MMHG | DIASTOLIC BLOOD PRESSURE: 91 MMHG

## 2022-07-25 DIAGNOSIS — M62.830 BACK MUSCLE SPASM: ICD-10-CM

## 2022-07-25 DIAGNOSIS — R35.0 URINARY FREQUENCY: Primary | ICD-10-CM

## 2022-07-25 DIAGNOSIS — M54.50 ACUTE BILATERAL LOW BACK PAIN WITHOUT SCIATICA: ICD-10-CM

## 2022-07-25 LAB
ALBUMIN UR-MCNC: NEGATIVE MG/DL
APPEARANCE UR: CLEAR
BILIRUB UR QL STRIP: NEGATIVE
COLOR UR AUTO: YELLOW
GLUCOSE UR STRIP-MCNC: NEGATIVE MG/DL
HGB UR QL STRIP: NEGATIVE
KETONES UR STRIP-MCNC: NEGATIVE MG/DL
LEUKOCYTE ESTERASE UR QL STRIP: NEGATIVE
NITRATE UR QL: NEGATIVE
PH UR STRIP: 5.5 [PH] (ref 5–7)
SP GR UR STRIP: >=1.03 (ref 1–1.03)
UROBILINOGEN UR STRIP-ACNC: 0.2 E.U./DL

## 2022-07-25 PROCEDURE — 99213 OFFICE O/P EST LOW 20 MIN: CPT | Performed by: FAMILY MEDICINE

## 2022-07-25 PROCEDURE — 81003 URINALYSIS AUTO W/O SCOPE: CPT | Performed by: FAMILY MEDICINE

## 2022-07-25 RX ORDER — METHOCARBAMOL 750 MG/1
750 TABLET, FILM COATED ORAL 3 TIMES DAILY PRN
Qty: 60 TABLET | Refills: 0 | Status: SHIPPED | OUTPATIENT
Start: 2022-07-25

## 2022-07-25 NOTE — PROGRESS NOTES
Margarita was seen today for uti, musculoskeletal problem and headache.    Diagnoses and all orders for this visit:    Urinary frequency    Acute bilateral low back pain without sciatica  -     methocarbamol (ROBAXIN) 750 MG tablet; Take 1 tablet (750 mg) by mouth 3 times daily as needed for muscle spasms    Back muscle spasm    Other orders  -     UA Macro with Reflex to Micro and Culture - lab collect            1.  Continue stretching and strengthening exercises.       2.  Continue prn heat or ice application.    ua is normal   Reviewed result with pt   Continue on back stretching exercises       SUBJECTIVE  HPI: Margarita Quinn is a 56 year old female who presents for evaluation of back pain  Symptoms began 5 day(s) ago, have been onset gradual and are stable.  Pain is located in the low back bilateral region, with radiation to does not radiate,   Recent injury:recent heavy lifting  Personal hx of back pain is no prior back problems.  Pain is exacerbated by: changing position.  Pain is relieved by: rest[unfilled] sx include: none.  Red flag symptoms: negative.    Past Medical History:   Diagnosis Date     Alopecia      Gastro-oesophageal reflux disease      Overactive bladder      Sinusitis, chronic      Tobacco abuse: [age] y/o @2ppd=31 pk yr hx  9/13/2013     Current Outpatient Medications   Medication Sig Dispense Refill     Calcium Citrate-Vitamin D 250-200 MG-UNIT TABS Take 1 tablet by mouth daily       cholecalciferol (VITAMIN D) 400 UNIT TABS Take 400 Units by mouth daily       fluticasone (FLONASE) 50 MCG/ACT nasal spray INHALE 2 SPRAYS IN EACH NOSTRIL EVERY DAY 16 g 1     Lactobacillus (PROBIOTIC ACIDOPHILUS PO) Take 1 capsule by mouth daily       methocarbamol (ROBAXIN) 750 MG tablet Take 1 tablet (750 mg) by mouth 3 times daily as needed for muscle spasms 60 tablet 0     MULTIPLE VITAMIN PO        oxybutynin ER (DITROPAN XL) 15 MG 24 hr tablet TAKE 1 TABLET BY MOUTH DAILY 90 tablet 3     cyclobenzaprine  (FLEXERIL) 5 MG tablet Take 1 tablet (5 mg) by mouth 3 times daily as needed for muscle spasms (Patient not taking: Reported on 2022) 16 tablet 0     pantoprazole (PROTONIX) 20 MG EC tablet Take 1 tablet (20 mg) by mouth daily (Patient not taking: Reported on 2022) 90 tablet 0     Social History     Tobacco Use     Smoking status: Former Smoker     Quit date: 10/11/1997     Years since quittin.8     Smokeless tobacco: Never Used   Substance Use Topics     Alcohol use: Yes     Alcohol/week: 0.0 standard drinks     Comment: couple drinks/week       ROS:  Review of systems negative except as stated above.    OBJECTIVE:  BP (!) 133/91   Pulse 88   Temp 99.2  F (37.3  C)   Resp 20   LMP 2016   SpO2 97%   Back examination: Back symmetric, no curvature. ROM normal. No CVA tenderness.  [unfilled] leg raise test: negative  GENERAL APPEARANCE: healthy, alert and no distress  RESP: lungs clear to auscultation - no rales, rhonchi or wheezes  CV: regular rates and rhythm, normal S1 S2, no murmur noted  ABDOMEN:  soft, nontender, no HSM or masses and bowel sounds normal  NEURO: Normal strength and tone with no weakness or sensory deficit noted, reflexes normal   SKIN: no suspicious lesions or rashes  PSYCH: mentation appears normal    Della Cortes MD     :

## 2022-10-16 ENCOUNTER — HEALTH MAINTENANCE LETTER (OUTPATIENT)
Age: 57
End: 2022-10-16

## 2023-11-04 ENCOUNTER — HEALTH MAINTENANCE LETTER (OUTPATIENT)
Age: 58
End: 2023-11-04

## 2024-10-19 ENCOUNTER — HEALTH MAINTENANCE LETTER (OUTPATIENT)
Age: 59
End: 2024-10-19

## 2024-12-22 ENCOUNTER — HEALTH MAINTENANCE LETTER (OUTPATIENT)
Age: 59
End: 2024-12-22